# Patient Record
Sex: MALE | Race: BLACK OR AFRICAN AMERICAN | Employment: UNEMPLOYED | URBAN - METROPOLITAN AREA
[De-identification: names, ages, dates, MRNs, and addresses within clinical notes are randomized per-mention and may not be internally consistent; named-entity substitution may affect disease eponyms.]

---

## 2019-06-27 ENCOUNTER — APPOINTMENT (OUTPATIENT)
Dept: GENERAL RADIOLOGY | Age: 44
DRG: 291 | End: 2019-06-27
Attending: STUDENT IN AN ORGANIZED HEALTH CARE EDUCATION/TRAINING PROGRAM
Payer: MEDICARE

## 2019-06-27 ENCOUNTER — APPOINTMENT (OUTPATIENT)
Dept: NON INVASIVE DIAGNOSTICS | Age: 44
DRG: 291 | End: 2019-06-27
Attending: PHYSICIAN ASSISTANT
Payer: MEDICARE

## 2019-06-27 ENCOUNTER — APPOINTMENT (OUTPATIENT)
Dept: CT IMAGING | Age: 44
DRG: 291 | End: 2019-06-27
Attending: EMERGENCY MEDICINE
Payer: MEDICARE

## 2019-06-27 ENCOUNTER — HOSPITAL ENCOUNTER (EMERGENCY)
Age: 44
Discharge: LEFT BEFORE DISCHARGE | DRG: 291 | End: 2019-06-27
Attending: STUDENT IN AN ORGANIZED HEALTH CARE EDUCATION/TRAINING PROGRAM
Payer: MEDICARE

## 2019-06-27 VITALS
RESPIRATION RATE: 28 BRPM | HEART RATE: 106 BPM | SYSTOLIC BLOOD PRESSURE: 105 MMHG | HEIGHT: 78 IN | TEMPERATURE: 97.8 F | DIASTOLIC BLOOD PRESSURE: 76 MMHG | OXYGEN SATURATION: 96 %

## 2019-06-27 DIAGNOSIS — R07.9 ACUTE CHEST PAIN: Primary | ICD-10-CM

## 2019-06-27 DIAGNOSIS — K43.9 VENTRAL HERNIA WITHOUT OBSTRUCTION OR GANGRENE: ICD-10-CM

## 2019-06-27 DIAGNOSIS — R77.8 ELEVATED TROPONIN: ICD-10-CM

## 2019-06-27 DIAGNOSIS — I50.9 ACUTE ON CHRONIC CONGESTIVE HEART FAILURE, UNSPECIFIED HEART FAILURE TYPE (HCC): ICD-10-CM

## 2019-06-27 DIAGNOSIS — N17.9 ACUTE RENAL FAILURE, UNSPECIFIED ACUTE RENAL FAILURE TYPE (HCC): ICD-10-CM

## 2019-06-27 LAB
ALBUMIN SERPL-MCNC: 3 G/DL (ref 3.5–5)
ALBUMIN/GLOB SERPL: 0.9 {RATIO} (ref 1.1–2.2)
ALP SERPL-CCNC: 300 U/L (ref 45–117)
ALT SERPL-CCNC: 123 U/L (ref 12–78)
ANION GAP SERPL CALC-SCNC: 10 MMOL/L (ref 5–15)
AST SERPL-CCNC: 69 U/L (ref 15–37)
ATRIAL RATE: 106 BPM
BASOPHILS # BLD: 0 K/UL (ref 0–0.1)
BASOPHILS NFR BLD: 0 % (ref 0–1)
BILIRUB SERPL-MCNC: 1.8 MG/DL (ref 0.2–1)
BNP SERPL-MCNC: 3228 PG/ML
BUN SERPL-MCNC: 47 MG/DL (ref 6–20)
BUN/CREAT SERPL: 16 (ref 12–20)
CALCIUM SERPL-MCNC: 8.5 MG/DL (ref 8.5–10.1)
CALCULATED P AXIS, ECG09: 62 DEGREES
CALCULATED R AXIS, ECG10: 118 DEGREES
CALCULATED T AXIS, ECG11: 23 DEGREES
CHLORIDE SERPL-SCNC: 102 MMOL/L (ref 97–108)
CK MB CFR SERPL CALC: 0.9 % (ref 0–2.5)
CK MB SERPL-MCNC: 6.1 NG/ML (ref 5–25)
CK SERPL-CCNC: 695 U/L (ref 39–308)
CO2 SERPL-SCNC: 26 MMOL/L (ref 21–32)
COMMENT, HOLDF: NORMAL
CREAT SERPL-MCNC: 2.85 MG/DL (ref 0.7–1.3)
DIAGNOSIS, 93000: NORMAL
DIFFERENTIAL METHOD BLD: ABNORMAL
EOSINOPHIL # BLD: 0.1 K/UL (ref 0–0.4)
EOSINOPHIL NFR BLD: 1 % (ref 0–7)
ERYTHROCYTE [DISTWIDTH] IN BLOOD BY AUTOMATED COUNT: 13.6 % (ref 11.5–14.5)
GLOBULIN SER CALC-MCNC: 3.3 G/DL (ref 2–4)
GLUCOSE SERPL-MCNC: 158 MG/DL (ref 65–100)
HCT VFR BLD AUTO: 39.8 % (ref 36.6–50.3)
HGB BLD-MCNC: 12 G/DL (ref 12.1–17)
IMM GRANULOCYTES # BLD AUTO: 0 K/UL (ref 0–0.04)
IMM GRANULOCYTES NFR BLD AUTO: 1 % (ref 0–0.5)
LIPASE SERPL-CCNC: 510 U/L (ref 73–393)
LYMPHOCYTES # BLD: 1.6 K/UL (ref 0.8–3.5)
LYMPHOCYTES NFR BLD: 29 % (ref 12–49)
MCH RBC QN AUTO: 29.3 PG (ref 26–34)
MCHC RBC AUTO-ENTMCNC: 30.2 G/DL (ref 30–36.5)
MCV RBC AUTO: 97.3 FL (ref 80–99)
MONOCYTES # BLD: 0.9 K/UL (ref 0–1)
MONOCYTES NFR BLD: 17 % (ref 5–13)
NEUTS SEG # BLD: 3 K/UL (ref 1.8–8)
NEUTS SEG NFR BLD: 52 % (ref 32–75)
NRBC # BLD: 0.02 K/UL (ref 0–0.01)
NRBC BLD-RTO: 0.4 PER 100 WBC
P-R INTERVAL, ECG05: 150 MS
PLATELET # BLD AUTO: 204 K/UL (ref 150–400)
PMV BLD AUTO: 11.1 FL (ref 8.9–12.9)
POTASSIUM SERPL-SCNC: 4.2 MMOL/L (ref 3.5–5.1)
PROT SERPL-MCNC: 6.3 G/DL (ref 6.4–8.2)
Q-T INTERVAL, ECG07: 370 MS
QRS DURATION, ECG06: 96 MS
QTC CALCULATION (BEZET), ECG08: 491 MS
RBC # BLD AUTO: 4.09 M/UL (ref 4.1–5.7)
SAMPLES BEING HELD,HOLD: NORMAL
SODIUM SERPL-SCNC: 138 MMOL/L (ref 136–145)
TROPONIN I SERPL-MCNC: 0.18 NG/ML
VENTRICULAR RATE, ECG03: 106 BPM
WBC # BLD AUTO: 5.6 K/UL (ref 4.1–11.1)

## 2019-06-27 PROCEDURE — 71046 X-RAY EXAM CHEST 2 VIEWS: CPT

## 2019-06-27 PROCEDURE — 99285 EMERGENCY DEPT VISIT HI MDM: CPT

## 2019-06-27 PROCEDURE — 85025 COMPLETE CBC W/AUTO DIFF WBC: CPT

## 2019-06-27 PROCEDURE — 74011250637 HC RX REV CODE- 250/637: Performed by: EMERGENCY MEDICINE

## 2019-06-27 PROCEDURE — 96375 TX/PRO/DX INJ NEW DRUG ADDON: CPT

## 2019-06-27 PROCEDURE — 93306 TTE W/DOPPLER COMPLETE: CPT

## 2019-06-27 PROCEDURE — 83690 ASSAY OF LIPASE: CPT

## 2019-06-27 PROCEDURE — 74176 CT ABD & PELVIS W/O CONTRAST: CPT

## 2019-06-27 PROCEDURE — 84484 ASSAY OF TROPONIN QUANT: CPT

## 2019-06-27 PROCEDURE — 83880 ASSAY OF NATRIURETIC PEPTIDE: CPT

## 2019-06-27 PROCEDURE — 96374 THER/PROPH/DIAG INJ IV PUSH: CPT

## 2019-06-27 PROCEDURE — 93005 ELECTROCARDIOGRAM TRACING: CPT

## 2019-06-27 PROCEDURE — 74011250636 HC RX REV CODE- 250/636: Performed by: EMERGENCY MEDICINE

## 2019-06-27 PROCEDURE — 36415 COLL VENOUS BLD VENIPUNCTURE: CPT

## 2019-06-27 PROCEDURE — 80053 COMPREHEN METABOLIC PANEL: CPT

## 2019-06-27 PROCEDURE — 82550 ASSAY OF CK (CPK): CPT

## 2019-06-27 PROCEDURE — 82553 CREATINE MB FRACTION: CPT

## 2019-06-27 RX ORDER — FUROSEMIDE 10 MG/ML
40 INJECTION INTRAMUSCULAR; INTRAVENOUS
Status: COMPLETED | OUTPATIENT
Start: 2019-06-27 | End: 2019-06-27

## 2019-06-27 RX ORDER — GUAIFENESIN 100 MG/5ML
324 LIQUID (ML) ORAL
Status: COMPLETED | OUTPATIENT
Start: 2019-06-27 | End: 2019-06-27

## 2019-06-27 RX ORDER — FAMOTIDINE 10 MG/ML
20 INJECTION INTRAVENOUS
Status: COMPLETED | OUTPATIENT
Start: 2019-06-27 | End: 2019-06-27

## 2019-06-27 RX ORDER — FUROSEMIDE 20 MG/1
20 TABLET ORAL 2 TIMES DAILY
Qty: 14 TAB | Refills: 0 | Status: SHIPPED | OUTPATIENT
Start: 2019-06-27 | End: 2019-07-04

## 2019-06-27 RX ORDER — DIPHENHYDRAMINE HYDROCHLORIDE 50 MG/ML
25 INJECTION, SOLUTION INTRAMUSCULAR; INTRAVENOUS
Status: COMPLETED | OUTPATIENT
Start: 2019-06-27 | End: 2019-06-27

## 2019-06-27 RX ORDER — SODIUM CHLORIDE 0.9 % (FLUSH) 0.9 %
10 SYRINGE (ML) INJECTION
Status: DISCONTINUED | OUTPATIENT
Start: 2019-06-27 | End: 2019-06-27

## 2019-06-27 RX ORDER — PROCHLORPERAZINE EDISYLATE 5 MG/ML
5 INJECTION INTRAMUSCULAR; INTRAVENOUS
Status: COMPLETED | OUTPATIENT
Start: 2019-06-27 | End: 2019-06-27

## 2019-06-27 RX ADMIN — ASPIRIN 81 MG 324 MG: 81 TABLET ORAL at 13:55

## 2019-06-27 RX ADMIN — PROCHLORPERAZINE EDISYLATE 5 MG: 5 INJECTION INTRAMUSCULAR; INTRAVENOUS at 13:05

## 2019-06-27 RX ADMIN — DIPHENHYDRAMINE HYDROCHLORIDE 25 MG: 50 INJECTION, SOLUTION INTRAMUSCULAR; INTRAVENOUS at 13:05

## 2019-06-27 RX ADMIN — FAMOTIDINE 20 MG: 10 INJECTION, SOLUTION INTRAVENOUS at 13:04

## 2019-06-27 RX ADMIN — FUROSEMIDE 40 MG: 10 INJECTION, SOLUTION INTRAMUSCULAR; INTRAVENOUS at 13:55

## 2019-06-27 RX ADMIN — NITROGLYCERIN 0.5 INCH: 20 OINTMENT TOPICAL at 13:55

## 2019-06-27 NOTE — ED TRIAGE NOTES
Pt c/o chest pain , sob and vomiting since this am, pt will not look up or make eye contact, pt also c/o abd pain , denies fever, pt also wants prescription refills for lasix and potassium, etc..the patient was told by [facility] he could come here and get them

## 2019-06-27 NOTE — DISCHARGE INSTRUCTIONS
Patient Education        Chest Pain: Care Instructions  Your Care Instructions    There are many things that can cause chest pain. Some are not serious and will get better on their own in a few days. But some kinds of chest pain need more testing and treatment. Your doctor may have recommended a follow-up visit in the next 8 to 12 hours. If you are not getting better, you may need more tests or treatment. Even though your doctor has released you, you still need to watch for any problems. The doctor carefully checked you, but sometimes problems can develop later. If you have new symptoms or if your symptoms do not get better, get medical care right away. If you have worse or different chest pain or pressure that lasts more than 5 minutes or you passed out (lost consciousness), call 911 or seek other emergency help right away. A medical visit is only one step in your treatment. Even if you feel better, you still need to do what your doctor recommends, such as going to all suggested follow-up appointments and taking medicines exactly as directed. This will help you recover and help prevent future problems. How can you care for yourself at home? · Rest until you feel better. · Take your medicine exactly as prescribed. Call your doctor if you think you are having a problem with your medicine. · Do not drive after taking a prescription pain medicine. When should you call for help? Call 911 if:    · You passed out (lost consciousness).     · You have severe difficulty breathing.     · You have symptoms of a heart attack. These may include:  ? Chest pain or pressure, or a strange feeling in your chest.  ? Sweating. ? Shortness of breath. ? Nausea or vomiting. ? Pain, pressure, or a strange feeling in your back, neck, jaw, or upper belly or in one or both shoulders or arms. ? Lightheadedness or sudden weakness. ? A fast or irregular heartbeat.   After you call 911, the  may tell you to chew 1 adult-strength or 2 to 4 low-dose aspirin. Wait for an ambulance. Do not try to drive yourself.    Call your doctor today if:    · You have any trouble breathing.     · Your chest pain gets worse.     · You are dizzy or lightheaded, or you feel like you may faint.     · You are not getting better as expected.     · You are having new or different chest pain. Where can you learn more? Go to http://lowell-sanya.info/. Enter A120 in the search box to learn more about \"Chest Pain: Care Instructions. \"  Current as of: September 23, 2018  Content Version: 11.9  © 8308-1073 [employer]. Care instructions adapted under license by [employer] (which disclaims liability or warranty for this information). If you have questions about a medical condition or this instruction, always ask your healthcare professional. [place] [address] any warranty or liability for your use of this information. Patient Education        Heart Failure: Care Instructions  Your Care Instructions    Heart failure occurs when your heart does not pump as much blood as the body needs. Failure does not mean that the heart has stopped pumping but rather that it is not pumping as well as it should. Over time, this causes fluid buildup in your lungs and other parts of your body. Fluid buildup can cause shortness of breath, fatigue, swollen ankles, and other problems. By taking medicines regularly, reducing sodium (salt) in your diet, checking your weight every day, and making lifestyle changes, you can feel better and live longer. Follow-up care is a key part of your treatment and safety. Be sure to make and go to all appointments, and call your doctor if you are having problems. It's also a good idea to know your test results and keep a list of the medicines you take. How can you care for yourself at home? Medicines    · Be safe with medicines. Take your medicines exactly as prescribed. Call your doctor if you think you are having a problem with your medicine.     · Do not take any vitamins, over-the-counter medicine, or herbal products without talking to your doctor first. Nasima Popper not take ibuprofen (Advil or Motrin) and naproxen (Aleve) without talking to your doctor first. They could make your heart failure worse.     · You may be taking some of the following medicine. ? Beta-blockers can slow heart rate, decrease blood pressure, and improve your condition. Taking a beta-blocker may lower your chance of needing to be hospitalized. ? Angiotensin-converting enzyme inhibitors (ACEIs) reduce the heart's workload, lower blood pressure, and reduce swelling. Taking an ACEI may lower your chance of needing to be hospitalized again. ? Angiotensin II receptor blockers (ARBs) work like ACEIs. Your doctor may prescribe them instead of ACEIs. ? Diuretics, also called water pills, reduce swelling. ? Potassium supplements replace this important mineral, which is sometimes lost with diuretics. ? Aspirin and other blood thinners prevent blood clots, which can cause a stroke or heart attack.    You will get more details on the specific medicines your doctor prescribes. Diet    · Your doctor may suggest that you limit sodium to 2,000 milligrams (mg) a day or less. That is less than 1 teaspoon of salt a day, including all the salt you eat in cooking or in packaged foods. People get most of their sodium from processed foods. Fast food and restaurant meals also tend to be very high in sodium.     · Ask your doctor how much liquid you can drink each day. You may have to limit liquids.    Weight    · Weigh yourself without clothing at the same time each day. Record your weight. Call your doctor if you have a sudden weight gain, such as more than 2 to 3 pounds in a day or 5 pounds in a week.  (Your doctor may suggest a different range of weight gain.) A sudden weight gain may mean that your heart failure is getting worse.    Activity level    · Start light exercise (if your doctor says it is okay). Even if you can only do a small amount, exercise will help you get stronger, have more energy, and manage your weight and your stress. Walking is an easy way to get exercise. Start out by walking a little more than you did before. Bit by bit, increase the amount you walk.     · When you exercise, watch for signs that your heart is working too hard. You are pushing yourself too hard if you cannot talk while you are exercising. If you become short of breath or dizzy or have chest pain, stop, sit down, and rest.     · If you feel \"wiped out\" the day after you exercise, walk slower or for a shorter distance until you can work up to a better pace.     · Get enough rest at night. Sleeping with 1 or 2 pillows under your upper body and head may help you breathe easier.    Lifestyle changes    · Do not smoke. Smoking can make a heart condition worse. If you need help quitting, talk to your doctor about stop-smoking programs and medicines. These can increase your chances of quitting for good. Quitting smoking may be the most important step you can take to protect your heart.     · Limit alcohol to 2 drinks a day for men and 1 drink a day for women. Too much alcohol can cause health problems.     · Avoid getting sick from colds and the flu. Get a pneumococcal vaccine shot. If you have had one before, ask your doctor whether you need another dose. Get a flu shot each year. If you must be around people with colds or the flu, wash your hands often. When should you call for help? Call 911 if you have symptoms of sudden heart failure such as:    · You have severe trouble breathing.     · You cough up pink, foamy mucus.     · You have a new irregular or rapid heartbeat.    Call your doctor now or seek immediate medical care if:    · You have new or increased shortness of breath.     · You are dizzy or lightheaded, or you feel like you may faint.   · You have sudden weight gain, such as more than 2 to 3 pounds in a day or 5 pounds in a week. (Your doctor may suggest a different range of weight gain.)     · You have increased swelling in your legs, ankles, or feet.     · You are suddenly so tired or weak that you cannot do your usual activities.    Watch closely for changes in your health, and be sure to contact your doctor if you develop new symptoms. Where can you learn more? Go to http://lowell-sanya.info/. Enter L978 in the search box to learn more about \"Heart Failure: Care Instructions. \"  Current as of: July 22, 2018  Content Version: 11.9  © 9119-1206 [employer]. Care instructions adapted under license by [employer] (which disclaims liability or warranty for this information). If you have questions about a medical condition or this instruction, always ask your healthcare professional. [place] [address] any warranty or liability for your use of this information. Patient Education        Hernia: Care Instructions  Your Care Instructions    A hernia develops when tissue bulges through a weak spot in the wall of your belly. The groin area and the navel are common areas for a hernia. A hernia can also develop near the area of a surgery you had before. Pressure from lifting, straining, or coughing can tear the weak area, causing the hernia to bulge and be painful. If you cannot push a hernia back into place, the tissue may become trapped outside the belly wall. If the hernia gets twisted and loses its blood supply, it will swell and die. This is called a strangulated hernia. It usually causes a lot of pain. It needs treatment right away. Some hernias need to be repaired to prevent a strangulated hernia. If your hernia causes symptoms or is large, you may need surgery. Follow-up care is a key part of your treatment and safety.  Be sure to make and go to all appointments, and call your doctor if you are having problems. It's also a good idea to know your test results and keep a list of the medicines you take. How can you care for yourself at home? · Take care when lifting heavy objects. · Stay at a healthy weight. · Do not smoke. Smoking can cause coughing, which can cause your hernia to bulge. If you need help quitting, talk to your doctor about stop-smoking programs and medicines. These can increase your chances of quitting for good. · Talk with your doctor before wearing a corset or truss for a hernia. These devices are not recommended for treating hernias and sometimes can do more harm than good. There may be certain situations when your doctor thinks a truss would work, but these are rare. When should you call for help? Call your doctor now or seek immediate medical care if:    · You have new or worse belly pain.     · You are vomiting.     · You cannot pass stools or gas.     · You cannot push the hernia back into place with gentle pressure when you are lying down.     · The area over the hernia turns red or becomes tender.    Watch closely for changes in your health, and be sure to contact your doctor if you have any problems. Where can you learn more? Go to http://lowell-sanya.info/. Enter C129 in the search box to learn more about \"Hernia: Care Instructions. \"  Current as of: March 27, 2018  Content Version: 11.9  © 9509-7175 [employer]. Care instructions adapted under license by [employer] (which disclaims liability or warranty for this information). If you have questions about a medical condition or this instruction, always ask your healthcare professional. [place] [address] any warranty or liability for your use of this information. Patient Education        Learning About Heart Failure  What is heart failure? Heart failure means that your heart muscle does not pump as much blood as your body needs. Failure does not mean that your heart has stopped. It means that your heart is not pumping as well as it should. Your body has an amazing ability to make up for heart failure. It may do such a good job that you don't know you have a disease. But at some point, your heart and body will no longer be able to keep up. Then fluid starts to build up in your lungs and other parts of your body. What can you expect when you have heart failure? Heart failure is a lifelong (chronic) disease. Treatment may be able to slow the disease and help you feel better. But heart failure tends to get worse over time. Despite this, there are many steps you can take to feel better and stay healthy longer. Early on, your symptoms may not be too bad. As heart failure gets worse, symptoms typically get worse, and you may need to limit your activities. Heart failure can also get worse suddenly. If this happens, you need emergency care. Then, after treatment, your symptoms may go back to being stable (which means they stay the same) for a long time. Heart failure can lead to other health problems, such as heart rhythm problems. Over time, your treatment options may change, especially as your symptoms get worse. As heart failure gets worse, palliative care can help improve the quality of your life. You can do advance care planning to decide what kind of care you want at the end of your life. What are the symptoms? Symptoms of heart failure start to happen when your heart can't pump enough blood to the rest of your body. In the early stages of heart failure, you may:  · Feel tired easily. · Be short of breath when you exert yourself. · Feel like your heart is pounding or racing (palpitations). · Feel weak or dizzy. As heart failure gets worse, fluid starts to build up in your lungs and other parts of your body.  This may cause you to:  · Feel short of breath even at rest.  · Have swelling (edema), especially in your legs, ankles, and feet.  · Gain weight. This may happen over just a day or two, or more slowly. · Cough or wheeze, especially when you lie down. How is heart failure treated? · You'll probably take several medicines. · You might attend cardiac rehabilitation (rehab) to get education and support that help you make lifestyle changes and stay as healthy as possible. · You may get a heart device. A pacemaker helps your heart pump blood. An ICD can stop abnormal heart rhythms. How can you care for yourself? There are many steps you can take to feel better and stay healthy longer. These steps are an important part of treatment. They can help you stay active and enjoy life. · Take your medicine the right way. Avoid medicines that can make your symptoms worse. · Check your weight and symptoms every day. Know what to do if your symptoms get worse. · Limit sodium. This helps keep fluid from building up. It may help you feel better. · Be active. Exercise regularly, but don't exercise too hard. · Be heart-healthy. Eat healthy foods, stay at a healthy weight, limit alcohol, and don't smoke. · Stay as healthy as possible. Avoid colds and flu, get help for depression and anxiety, and manage stress. Follow-up care is a key part of your treatment and safety. Be sure to make and go to all appointments, and call your doctor if you are having problems. It's also a good idea to know your test results and keep a list of the medicines you take. Where can you learn more? Go to http://lowlel-sanya.info/. Enter X728 in the search box to learn more about \"Learning About Heart Failure. \"  Current as of: July 22, 2018  Content Version: 11.9  © 8171-1535 [employer]. Care instructions adapted under license by [employer] (which disclaims liability or warranty for this information).  If you have questions about a medical condition or this instruction, always ask your healthcare professional. [employer] disclaims any warranty or liability for your use of this information.

## 2019-06-27 NOTE — ED NOTES
12:15 PM  I have evaluated the patient as the Provider in Triage. I have reviewed His vital signs and the triage nurse assessment. I have talked with the patient and any available family and advised that I am the provider in triage and have ordered the appropriate study to initiate their work up based on the clinical presentation during my assessment. I have advised that the patient will be accommodated in the Main ED as soon as possible. I have also requested to contact the triage nurse or myself immediately if the patient experiences any changes in their condition during this brief waiting period. Regan Nicholas NP    Pt reports abrupt onset of SOB, severe abdominal pain, chest pain, vomiting and increased bilateral lower leg edema; hx of CHF, DM. Regan Nicholas NP

## 2019-06-27 NOTE — ED NOTES
Pt discharged AMA. Questions answered. IV pulled by patient with tip intact. Pt left on motorized scooter.  Unable to obtain full set of vitals due to pt refusal.
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
## 2019-06-27 NOTE — ED NOTES
[id number] MD instructed this RN to draw Troponin now. When this RN went to do so, pt had pulled IV line and would not allow this RN to stick for lab draw. Refusing care at this time. MD aware. 80 MD at bedside to discuss AMA with patient. Pt signed form with this RN as witness.

## 2019-06-27 NOTE — CONSULTS
[facility] Cardiology Consult         [address] [facility] [address], [address]   [phone] fax [phone]    Name: Vinnie Lima  [id number]  6/27/2019 4:01 PM    Assessment/Plan:  1. CHF EF 25%- per pt report, echo today to look at function  -previously offered ICD, declined, details unknown  -hypotensive without GDMT, apparently only on lasix po  -abd protuberance, mild LE swelling  Favor trial of GDMT with low dose toprol and isdn/hydralazine if staying inpt, unable to discuss with pt at this time and given hypotension and renal failure titration would ideally be inpt. 2. CKD/MICHAEL- crt 2.8 unk baseline, repeat am  3. There is no height or weight on file to calculate BMI. 4. Hypotension- presumed due to CHF  5. Tachycardia- would attempt bblockade  6. Troponin-nonspecific elevation    Interview is not revealing and guidance is limited by pts unwillingness to stay and unwillingness to communicate with me. Admit Date: 6/27/2019     Admit Diagnosis: No admission diagnoses are documented for this encounter. Primary Care Physician:Stella De La Vega MD     Attending Provider: Uriel Sampson MD  Requesting Provider:Dr. Jose Antonio Rob FOR CONSULT: CHF    Subjective:     Vinnie Lima is a 40 y.o. male admitted for abd pain. Unclear history and pt can not talk to me at all. He is sleepy/groggy and denies pain. When asked if he can/will talk to me he says \"no. \" Otherwise does not respond to my questions. Review of Symptoms:  Review of systems not obtained due to patient factors. Previous treatment/evaluation includes unknown . Cardiac risk factors: sedentary life style, male gender, hypertension, stress.     Past Medical History:   Diagnosis Date    [facility]congest[facility]) ([facility] [address]     Diabetes ([facility] [address]      Past Surgical History:   Procedure Laterality Date    HX OTHER SURGICAL      shoulder, hand, elbow and stomach surgery      No current facility-administered medications for this encounter. Current Outpatient Medications   Medication Sig    furosemide (LASIX) 20 mg tablet Take 1 Tab by mouth two (2) times a day for 7 days. Allergies   Allergen Reactions    Demerol [Meperidine] Other (comments)      No family history on file. Social History     Socioeconomic History    Marital status: SINGLE     Spouse name: Not on file    Number of children: Not on file    Years of education: Not on file    Highest education level: Not on file          Objective:      Physical Exam  Vitals:    06/27/19 1330 06/27/19 1355 06/27/19 1400 06/27/19 1503   BP: 110/69 110/69 109/86 109/86   Pulse: (!) 112 (!) 108 (!) 105    Resp: (!) 33  25    Temp:       SpO2:  94% 95%    Height:    7' (2.134 m)       General:  Sleepy nad   Eyes:  Conjunctivae/corneas clear     Ears:  Normal external ear canals both ears. Nose:  No drainage or sinus tenderness. Mouth/Throat: Moist mucous membranes. Dentition poor   Neck: Symmetrical, trachea midline, no carotid bruit     Back:   No CVA tenderness   Lungs:   Clear to auscultation bilaterally. Heart:  Regular rate and rhythm, S1, S2 normal, no murmur, click, rub or gallop. Abdomen:   Full firm protuberant ? tender   Extremities: Extremities mild le edema without trauma   Vascular: 1+ and symmetric LE bilat   Skin: Skin color normal. No rashes or lesions   Lymph nodes: No Lymphadenopathy   Neurologic: CNII-XII intact.  Normal strength        Telemetry: normal sinus rhythm  ECG: nonspecific ST and T waves changes  Echocardiogram: Abnormal, and reviewed by myself: LVH/strain    Data Review:     Recent Labs     06/27/19  1242   CKMB 6.1*   TROIQ 0.18*     Recent Labs     06/27/19  1242      K 4.2      CO2 26   BUN 47*   CREA 2.85*   *   CA 8.5     Recent Labs     06/27/19  1242   WBC 5.6   HGB 12.0*   HCT 39.8        Recent Labs     06/27/19  1242   SGOT 69*   *     No results for input(s): CHOL, LDLC in the last 72 hours.    No lab exists for component: TGL, HDLC,  HBA1C  No results for input(s): CRP, TSH, TSHEXT in the last 72 hours. No lab exists for component: ESR  Thank you very much for this referral. I appreciate the opportunity to participate in this patient's care.       MD Maia Narvaez MD

## 2019-06-28 ENCOUNTER — HOSPITAL ENCOUNTER (INPATIENT)
Age: 44
LOS: 1 days | Discharge: LEFT BEFORE DISCHARGE | DRG: 291 | End: 2019-06-30
Attending: EMERGENCY MEDICINE | Admitting: INTERNAL MEDICINE
Payer: MEDICARE

## 2019-06-28 ENCOUNTER — APPOINTMENT (OUTPATIENT)
Dept: CT IMAGING | Age: 44
DRG: 291 | End: 2019-06-28
Attending: FAMILY MEDICINE
Payer: MEDICARE

## 2019-06-28 ENCOUNTER — APPOINTMENT (OUTPATIENT)
Dept: GENERAL RADIOLOGY | Age: 44
DRG: 291 | End: 2019-06-28
Attending: EMERGENCY MEDICINE
Payer: MEDICARE

## 2019-06-28 DIAGNOSIS — Z79.899 RECEIVING INOTROPIC MEDICATION: ICD-10-CM

## 2019-06-28 DIAGNOSIS — I50.43 ACUTE ON CHRONIC COMBINED SYSTOLIC AND DIASTOLIC ACC/AHA STAGE C CONGESTIVE HEART FAILURE (HCC): ICD-10-CM

## 2019-06-28 DIAGNOSIS — R10.84 ABDOMINAL PAIN, GENERALIZED: Primary | ICD-10-CM

## 2019-06-28 DIAGNOSIS — R07.9 CHEST PAIN, UNSPECIFIED TYPE: ICD-10-CM

## 2019-06-28 PROBLEM — I50.9 CHF (CONGESTIVE HEART FAILURE) (HCC): Status: ACTIVE | Noted: 2019-06-28

## 2019-06-28 PROBLEM — R10.9 ABDOMINAL PAIN: Status: ACTIVE | Noted: 2019-06-28

## 2019-06-28 LAB
ALBUMIN SERPL-MCNC: 3 G/DL (ref 3.5–5)
ALBUMIN/GLOB SERPL: 0.9 {RATIO} (ref 1.1–2.2)
ALP SERPL-CCNC: 314 U/L (ref 45–117)
ALT SERPL-CCNC: 126 U/L (ref 12–78)
AMMONIA PLAS-SCNC: 35 UMOL/L
AMORPH CRY URNS QL MICRO: ABNORMAL
AMPHET UR QL SCN: NEGATIVE
ANION GAP SERPL CALC-SCNC: 11 MMOL/L (ref 5–15)
APPEARANCE UR: ABNORMAL
ARTERIAL PATENCY WRIST A: YES
AST SERPL-CCNC: 114 U/L (ref 15–37)
ATRIAL RATE: 112 BPM
BACTERIA URNS QL MICRO: NEGATIVE /HPF
BARBITURATES UR QL SCN: NEGATIVE
BASE DEFICIT BLD-SCNC: 1 MMOL/L
BASOPHILS # BLD: 0 K/UL (ref 0–0.1)
BASOPHILS NFR BLD: 0 % (ref 0–1)
BDY SITE: ABNORMAL
BENZODIAZ UR QL: NEGATIVE
BILIRUB SERPL-MCNC: 1.3 MG/DL (ref 0.2–1)
BILIRUB UR QL: NEGATIVE
BNP SERPL-MCNC: 2752 PG/ML
BUN SERPL-MCNC: 46 MG/DL (ref 6–20)
BUN/CREAT SERPL: 16 (ref 12–20)
CALCIUM SERPL-MCNC: 8.6 MG/DL (ref 8.5–10.1)
CALCULATED P AXIS, ECG09: 51 DEGREES
CALCULATED R AXIS, ECG10: 91 DEGREES
CALCULATED T AXIS, ECG11: 63 DEGREES
CANNABINOIDS UR QL SCN: NEGATIVE
CHLORIDE SERPL-SCNC: 102 MMOL/L (ref 97–108)
CO2 SERPL-SCNC: 24 MMOL/L (ref 21–32)
COCAINE UR QL SCN: NEGATIVE
COLOR UR: ABNORMAL
COMMENT, HOLDF: NORMAL
COMMENT, HOLDF: NORMAL
CREAT SERPL-MCNC: 2.96 MG/DL (ref 0.7–1.3)
DIAGNOSIS, 93000: NORMAL
DIFFERENTIAL METHOD BLD: ABNORMAL
DRUG SCRN COMMENT,DRGCM: NORMAL
ECHO AO ROOT DIAM: 3.06 CM
ECHO AV PEAK GRADIENT: 2.1 MMHG
ECHO AV PEAK VELOCITY: 71.66 CM/S
ECHO LA MAJOR AXIS: 4.93 CM
ECHO LA TO AORTIC ROOT RATIO: 1.61
ECHO LV E' LATERAL VELOCITY: 7.94 CM/S
ECHO LV E' SEPTAL VELOCITY: 7.29 CM/S
ECHO LV INTERNAL DIMENSION DIASTOLIC: 7.43 CM (ref 4.2–5.9)
ECHO LV INTERNAL DIMENSION SYSTOLIC: 7 CM
ECHO LV IVSD: 0.65 CM (ref 0.6–1)
ECHO LV MASS 2D: 264.1 G (ref 88–224)
ECHO LV MASS INDEX 2D: 113.9 G/M2 (ref 49–115)
ECHO LV POSTERIOR WALL DIASTOLIC: 0.7 CM (ref 0.6–1)
ECHO MV A VELOCITY: 55.48 CM/S
ECHO MV AREA PHT: 9.4 CM2
ECHO MV E DECELERATION TIME (DT): 80.4 MS
ECHO MV E VELOCITY: 86.34 CM/S
ECHO MV E/A RATIO: 1.56
ECHO MV E/E' LATERAL: 10.87
ECHO MV E/E' RATIO (AVERAGED): 11.36
ECHO MV E/E' SEPTAL: 11.84
ECHO MV PRESSURE HALF TIME (PHT): 23.3 MS
ECHO PULMONARY ARTERY SYSTOLIC PRESSURE (PASP): 48 MMHG
ECHO PV MAX VELOCITY: 41.54 CM/S
ECHO PV PEAK GRADIENT: 0.7 MMHG
ECHO RV INTERNAL DIMENSION: 4.65 CM
ECHO RV TAPSE: 1.69 CM (ref 1.5–2)
ECHO TV REGURGITANT MAX VELOCITY: 307.89 CM/S
ECHO TV REGURGITANT PEAK GRADIENT: 37.9 MMHG
EOSINOPHIL # BLD: 0.1 K/UL (ref 0–0.4)
EOSINOPHIL NFR BLD: 2 % (ref 0–7)
EPITH CASTS URNS QL MICRO: ABNORMAL /LPF
ERYTHROCYTE [DISTWIDTH] IN BLOOD BY AUTOMATED COUNT: 13.7 % (ref 11.5–14.5)
EST. AVERAGE GLUCOSE BLD GHB EST-MCNC: 200 MG/DL
ETHANOL SERPL-MCNC: <10 MG/DL
FERRITIN SERPL-MCNC: 35 NG/ML (ref 26–388)
GAS FLOW.O2 O2 DELIVERY SYS: ABNORMAL L/MIN
GLOBULIN SER CALC-MCNC: 3.5 G/DL (ref 2–4)
GLUCOSE BLD STRIP.AUTO-MCNC: 196 MG/DL (ref 65–100)
GLUCOSE BLD STRIP.AUTO-MCNC: 226 MG/DL (ref 65–100)
GLUCOSE SERPL-MCNC: 174 MG/DL (ref 65–100)
GLUCOSE UR STRIP.AUTO-MCNC: NEGATIVE MG/DL
HAV IGM SER QL: NONREACTIVE
HBA1C MFR BLD: 8.6 % (ref 4.2–6.3)
HBV CORE IGM SER QL: NONREACTIVE
HBV SURFACE AG SER QL: <0.1 INDEX
HBV SURFACE AG SER QL: NEGATIVE
HCO3 BLD-SCNC: 24 MMOL/L (ref 22–26)
HCT VFR BLD AUTO: 39.4 % (ref 36.6–50.3)
HCV AB SERPL QL IA: NONREACTIVE
HCV COMMENT,HCGAC: NORMAL
HGB BLD-MCNC: 12 G/DL (ref 12.1–17)
HGB UR QL STRIP: ABNORMAL
HIV 1+2 AB+HIV1 P24 AG SERPL QL IA: NONREACTIVE
HIV12 RESULT COMMENT, HHIVC: NORMAL
HYALINE CASTS URNS QL MICRO: ABNORMAL /LPF (ref 0–5)
IMM GRANULOCYTES # BLD AUTO: 0 K/UL (ref 0–0.04)
IMM GRANULOCYTES NFR BLD AUTO: 0 % (ref 0–0.5)
IRON SATN MFR SERPL: 5 % (ref 20–50)
IRON SERPL-MCNC: 19 UG/DL (ref 35–150)
KETONES UR QL STRIP.AUTO: NEGATIVE MG/DL
LEUKOCYTE ESTERASE UR QL STRIP.AUTO: NEGATIVE
LYMPHOCYTES # BLD: 1.8 K/UL (ref 0.8–3.5)
LYMPHOCYTES NFR BLD: 32 % (ref 12–49)
MAGNESIUM SERPL-MCNC: 2.1 MG/DL (ref 1.6–2.4)
MCH RBC QN AUTO: 29.8 PG (ref 26–34)
MCHC RBC AUTO-ENTMCNC: 30.5 G/DL (ref 30–36.5)
MCV RBC AUTO: 97.8 FL (ref 80–99)
METHADONE UR QL: NEGATIVE
MONOCYTES # BLD: 1 K/UL (ref 0–1)
MONOCYTES NFR BLD: 17 % (ref 5–13)
NEUTS SEG # BLD: 2.8 K/UL (ref 1.8–8)
NEUTS SEG NFR BLD: 49 % (ref 32–75)
NITRITE UR QL STRIP.AUTO: NEGATIVE
NRBC # BLD: 0 K/UL (ref 0–0.01)
NRBC BLD-RTO: 0 PER 100 WBC
OPIATES UR QL: NEGATIVE
P-R INTERVAL, ECG05: 148 MS
PCO2 BLD: 41.6 MMHG (ref 35–45)
PCP UR QL: NEGATIVE
PH BLD: 7.37 [PH] (ref 7.35–7.45)
PH UR STRIP: 5 [PH] (ref 5–8)
PLATELET # BLD AUTO: 207 K/UL (ref 150–400)
PMV BLD AUTO: 11.4 FL (ref 8.9–12.9)
PO2 BLD: 77 MMHG (ref 80–100)
POTASSIUM SERPL-SCNC: 4.8 MMOL/L (ref 3.5–5.1)
PROT SERPL-MCNC: 6.5 G/DL (ref 6.4–8.2)
PROT UR STRIP-MCNC: 100 MG/DL
Q-T INTERVAL, ECG07: 344 MS
QRS DURATION, ECG06: 94 MS
QTC CALCULATION (BEZET), ECG08: 469 MS
RBC # BLD AUTO: 4.03 M/UL (ref 4.1–5.7)
RBC #/AREA URNS HPF: ABNORMAL /HPF (ref 0–5)
SAMPLES BEING HELD,HOLD: NORMAL
SAO2 % BLD: 95 % (ref 92–97)
SERVICE CMNT-IMP: ABNORMAL
SERVICE CMNT-IMP: ABNORMAL
SODIUM SERPL-SCNC: 137 MMOL/L (ref 136–145)
SP GR UR REFRACTOMETRY: 1.02 (ref 1–1.03)
SP1: NORMAL
SP2: NORMAL
SP3: NORMAL
SPECIMEN TYPE: ABNORMAL
T4 SERPL-MCNC: 10.4 UG/DL (ref 4.5–12.1)
TIBC SERPL-MCNC: 382 UG/DL (ref 250–450)
TOTAL RESP. RATE, ITRR: 23
TROPONIN I SERPL-MCNC: 0.15 NG/ML
TROPONIN I SERPL-MCNC: 0.18 NG/ML
TROPONIN I SERPL-MCNC: 0.18 NG/ML
TSH SERPL DL<=0.05 MIU/L-ACNC: 11.1 UIU/ML (ref 0.36–3.74)
UR CULT HOLD, URHOLD: NORMAL
UROBILINOGEN UR QL STRIP.AUTO: 0.2 EU/DL (ref 0.2–1)
VENTRICULAR RATE, ECG03: 112 BPM
WBC # BLD AUTO: 5.8 K/UL (ref 4.1–11.1)
WBC URNS QL MICRO: ABNORMAL /HPF (ref 0–4)

## 2019-06-28 PROCEDURE — 84484 ASSAY OF TROPONIN QUANT: CPT

## 2019-06-28 PROCEDURE — 82784 ASSAY IGA/IGD/IGG/IGM EACH: CPT

## 2019-06-28 PROCEDURE — 74011250637 HC RX REV CODE- 250/637: Performed by: NURSE PRACTITIONER

## 2019-06-28 PROCEDURE — 83880 ASSAY OF NATRIURETIC PEPTIDE: CPT

## 2019-06-28 PROCEDURE — 99218 HC RM OBSERVATION: CPT

## 2019-06-28 PROCEDURE — 81001 URINALYSIS AUTO W/SCOPE: CPT

## 2019-06-28 PROCEDURE — 85025 COMPLETE CBC W/AUTO DIFF WBC: CPT

## 2019-06-28 PROCEDURE — 80307 DRUG TEST PRSMV CHEM ANLYZR: CPT

## 2019-06-28 PROCEDURE — 84481 FREE ASSAY (FT-3): CPT

## 2019-06-28 PROCEDURE — 93005 ELECTROCARDIOGRAM TRACING: CPT

## 2019-06-28 PROCEDURE — 83735 ASSAY OF MAGNESIUM: CPT

## 2019-06-28 PROCEDURE — 83036 HEMOGLOBIN GLYCOSYLATED A1C: CPT

## 2019-06-28 PROCEDURE — 82140 ASSAY OF AMMONIA: CPT

## 2019-06-28 PROCEDURE — 99285 EMERGENCY DEPT VISIT HI MDM: CPT

## 2019-06-28 PROCEDURE — 71045 X-RAY EXAM CHEST 1 VIEW: CPT

## 2019-06-28 PROCEDURE — 74011636637 HC RX REV CODE- 636/637: Performed by: FAMILY MEDICINE

## 2019-06-28 PROCEDURE — 83540 ASSAY OF IRON: CPT

## 2019-06-28 PROCEDURE — 84436 ASSAY OF TOTAL THYROXINE: CPT

## 2019-06-28 PROCEDURE — 82803 BLOOD GASES ANY COMBINATION: CPT

## 2019-06-28 PROCEDURE — 84443 ASSAY THYROID STIM HORMONE: CPT

## 2019-06-28 PROCEDURE — 96365 THER/PROPH/DIAG IV INF INIT: CPT

## 2019-06-28 PROCEDURE — 82728 ASSAY OF FERRITIN: CPT

## 2019-06-28 PROCEDURE — 80053 COMPREHEN METABOLIC PANEL: CPT

## 2019-06-28 PROCEDURE — 77010033678 HC OXYGEN DAILY

## 2019-06-28 PROCEDURE — 74011000250 HC RX REV CODE- 250: Performed by: NURSE PRACTITIONER

## 2019-06-28 PROCEDURE — 74011250636 HC RX REV CODE- 250/636: Performed by: FAMILY MEDICINE

## 2019-06-28 PROCEDURE — 36600 WITHDRAWAL OF ARTERIAL BLOOD: CPT

## 2019-06-28 PROCEDURE — 74011000250 HC RX REV CODE- 250: Performed by: INTERNAL MEDICINE

## 2019-06-28 PROCEDURE — 74011250637 HC RX REV CODE- 250/637: Performed by: FAMILY MEDICINE

## 2019-06-28 PROCEDURE — 80074 ACUTE HEPATITIS PANEL: CPT

## 2019-06-28 PROCEDURE — 94762 N-INVAS EAR/PLS OXIMTRY CONT: CPT

## 2019-06-28 PROCEDURE — 96366 THER/PROPH/DIAG IV INF ADDON: CPT

## 2019-06-28 PROCEDURE — 96375 TX/PRO/DX INJ NEW DRUG ADDON: CPT

## 2019-06-28 PROCEDURE — 36415 COLL VENOUS BLD VENIPUNCTURE: CPT

## 2019-06-28 PROCEDURE — 82962 GLUCOSE BLOOD TEST: CPT

## 2019-06-28 PROCEDURE — 87389 HIV-1 AG W/HIV-1&-2 AB AG IA: CPT

## 2019-06-28 RX ORDER — DEXTROSE 50 % IN WATER (D50W) INTRAVENOUS SYRINGE
25-50 AS NEEDED
Status: DISCONTINUED | OUTPATIENT
Start: 2019-06-28 | End: 2019-06-30 | Stop reason: HOSPADM

## 2019-06-28 RX ORDER — FUROSEMIDE 10 MG/ML
40 INJECTION INTRAMUSCULAR; INTRAVENOUS 2 TIMES DAILY
Status: DISCONTINUED | OUTPATIENT
Start: 2019-06-28 | End: 2019-06-28

## 2019-06-28 RX ORDER — SODIUM CHLORIDE 0.9 % (FLUSH) 0.9 %
5-40 SYRINGE (ML) INJECTION EVERY 8 HOURS
Status: DISCONTINUED | OUTPATIENT
Start: 2019-06-28 | End: 2019-06-30 | Stop reason: HOSPADM

## 2019-06-28 RX ORDER — MAGNESIUM SULFATE 100 %
4 CRYSTALS MISCELLANEOUS AS NEEDED
Status: DISCONTINUED | OUTPATIENT
Start: 2019-06-28 | End: 2019-06-30 | Stop reason: HOSPADM

## 2019-06-28 RX ORDER — HEPARIN SODIUM 5000 [USP'U]/ML
5000 INJECTION, SOLUTION INTRAVENOUS; SUBCUTANEOUS EVERY 8 HOURS
Status: DISCONTINUED | OUTPATIENT
Start: 2019-06-28 | End: 2019-06-30 | Stop reason: HOSPADM

## 2019-06-28 RX ORDER — GUAIFENESIN 100 MG/5ML
81 LIQUID (ML) ORAL DAILY
Status: DISCONTINUED | OUTPATIENT
Start: 2019-06-28 | End: 2019-06-28 | Stop reason: SDUPTHER

## 2019-06-28 RX ORDER — SODIUM CHLORIDE 0.9 % (FLUSH) 0.9 %
10 SYRINGE (ML) INJECTION
Status: ACTIVE | OUTPATIENT
Start: 2019-06-28 | End: 2019-06-29

## 2019-06-28 RX ORDER — OMEPRAZOLE 20 MG/1
20 CAPSULE, DELAYED RELEASE ORAL 2 TIMES DAILY
COMMUNITY

## 2019-06-28 RX ORDER — INSULIN LISPRO 100 [IU]/ML
INJECTION, SOLUTION INTRAVENOUS; SUBCUTANEOUS
Status: DISCONTINUED | OUTPATIENT
Start: 2019-06-28 | End: 2019-06-30 | Stop reason: HOSPADM

## 2019-06-28 RX ORDER — THERA TABS 400 MCG
1 TAB ORAL DAILY
COMMUNITY

## 2019-06-28 RX ORDER — PANTOPRAZOLE SODIUM 40 MG/1
40 TABLET, DELAYED RELEASE ORAL
Status: DISCONTINUED | OUTPATIENT
Start: 2019-06-28 | End: 2019-06-30 | Stop reason: HOSPADM

## 2019-06-28 RX ORDER — POTASSIUM CHLORIDE 750 MG/1
20 TABLET, FILM COATED, EXTENDED RELEASE ORAL DAILY
COMMUNITY

## 2019-06-28 RX ORDER — ASPIRIN 81 MG/1
81 TABLET ORAL DAILY
Status: DISCONTINUED | OUTPATIENT
Start: 2019-06-28 | End: 2019-06-30 | Stop reason: HOSPADM

## 2019-06-28 RX ORDER — SODIUM CHLORIDE 0.9 % (FLUSH) 0.9 %
5-40 SYRINGE (ML) INJECTION AS NEEDED
Status: DISCONTINUED | OUTPATIENT
Start: 2019-06-28 | End: 2019-06-30 | Stop reason: HOSPADM

## 2019-06-28 RX ORDER — DICYCLOMINE HYDROCHLORIDE 10 MG/ML
20 INJECTION INTRAMUSCULAR ONCE
Status: DISPENSED | OUTPATIENT
Start: 2019-06-28 | End: 2019-06-29

## 2019-06-28 RX ORDER — DOBUTAMINE HYDROCHLORIDE 200 MG/100ML
2.5 INJECTION INTRAVENOUS
Status: DISCONTINUED | OUTPATIENT
Start: 2019-06-28 | End: 2019-06-30 | Stop reason: HOSPADM

## 2019-06-28 RX ORDER — ACETAMINOPHEN 325 MG/1
650 TABLET ORAL
Status: DISCONTINUED | OUTPATIENT
Start: 2019-06-28 | End: 2019-06-30 | Stop reason: HOSPADM

## 2019-06-28 RX ADMIN — FUROSEMIDE 40 MG: 10 INJECTION, SOLUTION INTRAMUSCULAR; INTRAVENOUS at 10:12

## 2019-06-28 RX ADMIN — HEPARIN SODIUM 5000 UNITS: 5000 INJECTION INTRAVENOUS; SUBCUTANEOUS at 18:47

## 2019-06-28 RX ADMIN — DOBUTAMINE IN DEXTROSE 5 MCG/KG/MIN: 200 INJECTION, SOLUTION INTRAVENOUS at 16:29

## 2019-06-28 RX ADMIN — ASPIRIN 81 MG: 81 TABLET ORAL at 10:12

## 2019-06-28 RX ADMIN — BUMETANIDE 0.5 MG/HR: 0.25 INJECTION INTRAMUSCULAR; INTRAVENOUS at 18:38

## 2019-06-28 RX ADMIN — INSULIN LISPRO 2 UNITS: 100 INJECTION, SOLUTION INTRAVENOUS; SUBCUTANEOUS at 22:55

## 2019-06-28 RX ADMIN — HEPARIN SODIUM 5000 UNITS: 5000 INJECTION INTRAVENOUS; SUBCUTANEOUS at 10:12

## 2019-06-28 RX ADMIN — PANTOPRAZOLE SODIUM 40 MG: 40 TABLET, DELAYED RELEASE ORAL at 21:25

## 2019-06-28 RX ADMIN — INSULIN LISPRO 2 UNITS: 100 INJECTION, SOLUTION INTRAVENOUS; SUBCUTANEOUS at 18:52

## 2019-06-28 NOTE — CONSULTS
[facility]   [address] [facility], [address] [place] [address], [facility]  Phone: [phone] NOTE     Patient: Sukhdeep Ray MRN: [id number]  PCP: Cyrus Fernandez MD   :     1975  Age:   40 y.o. Sex:  male      Referring physician: Franklyn Hauser MD  Reason for consultation: 40 y.o. male with Abdominal pain [R49.0] complicated by MICHAEL   Admission Date: 2019  3:39 AM  LOS: 0 days      ASSESSMENT and PLAN :   ARF   · Suspect Cardiorenal syndrome  · Renal Imaging : Kidneys unremarkable   · UA : trace proteinuria and hematuria , otherwise unremarkable   · Volume overloaded from CHF  · Suggest Inotropes and diuresis at the direction of cardiology   · No emergent need for dialysis and he is not a candidate for dialysis due to severe CMP and poor compliance     Acute on Chronic Systolic CHF w/ EF 8-73%  · Per cardiology     Anxiety   Depression   Type II DM   PTSD    Care Plan discussed with:  Pt and cardiology       Thank you for consulting [facility] in the care of your patient. Subjective:   HPI: Sukhdeep Ray is a 40 y.o.  male who has been admitted to the hospital for worsening SOB, fatigue and edema. We were asked to see him for evaluation of ARF. He is a poor historian and unable to give me much Hx . He tells me that he is from [facility] [place] and is visiting family on [address] [place] [address]. His Hx from hospitalizations at [address] [place] [address] over the last 6 months is reviewed through care everywhere. He had normal renal function until April. His creatinine was 2.0-2.2 a week ago. He denies any NSAID use.  Denies any N/V/D/ denies any chest pain     Past Medical Hx:   Past Medical History:   Diagnosis Date    CHF (congestive heart failure) (HCC)     Diabetes ([facility] [address]         Past Surgical Hx:     Past Surgical History:   Procedure Laterality Date    HX OTHER SURGICAL      shoulder, hand, elbow and stomach surgery Allergies   Allergen Reactions    Demerol [Meperidine] Other (comments)       Social Hx:  reports that he has never smoked. He does not have any smokeless tobacco history on file. He reports that he does not drink alcohol or use drugs. No family history on file. Review of Systems:  A thorough twelve point review of system was performed today. Pertinent positives and negatives are mentioned in the HPI. The reminder of the ROS is negative and noncontributory. Objective:    Vitals:    Vitals:    06/28/19 1000 06/28/19 1048 06/28/19 1126 06/28/19 1130   BP: 105/77  124/81 118/77   Pulse: (!) 109  (!) 107 (!) 103   Resp: 16  18 18   Temp: 98 °F (36.7 °C)      SpO2: 94% 95% 99%    Weight:       Height:         I&O's:  No intake/output data recorded.   Visit Vitals  /77   Pulse (!) 103   Temp 98 °F (36.7 °C)   Resp 18   Ht 6' 1\" (1.854 m)   Wt 108.2 kg (238 lb 8 oz)   SpO2 99%   BMI 31.47 kg/m²       Physical Exam:  General:  Lethargic, ill looking   HEENT: PERRL,  No Pallor , No Icterus  Neck: Supple,no mass palpable  Lungs : CTA  CVS: RRR, S1 S2 normal, + murmur   Abdomen: Soft, NT, BS +  Extremities: 3+ Edema  Skin: scars- generalized   MS: No joint swelling, erythema, warmth  Neurologic: lethargic   Psych: Unable to assess    Laboratory Results:    Recent Labs     06/28/19  0929 06/28/19  0504 06/27/19  1242   NA  --  137 138   K  --  4.8 4.2   CL  --  102 102   CO2  --  24 26   GLU  --  174* 158*   BUN  --  46* 47*   CREA  --  2.96* 2.85*   CA  --  8.6 8.5   MG 2.1  --   --    ALB  --  3.0* 3.0*   SGOT  --  114* 69*   ALT  --  126* 123*     Recent Labs     06/28/19  0504 06/27/19  1242   WBC 5.8 5.6   HGB 12.0* 12.0*   HCT 39.4 39.8    204     No results found for: SDES  No results found for: CULT  Recent Results (from the past 24 hour(s))   CBC WITH AUTOMATED DIFF    Collection Time: 06/27/19 12:42 PM   Result Value Ref Range    WBC 5.6 4.1 - 11.1 K/uL    RBC 4.09 (L) 4.10 - 5.70 M/uL HGB 12.0 (L) 12.1 - 17.0 g/dL    HCT 39.8 36.6 - 50.3 %    MCV 97.3 80.0 - 99.0 FL    MCH 29.3 26.0 - 34.0 PG    MCHC 30.2 30.0 - 36.5 g/dL    RDW 13.6 11.5 - 14.5 %    PLATELET 920 263 - 581 K/uL    MPV 11.1 8.9 - 12.9 FL    NRBC 0.4 (H) 0  WBC    ABSOLUTE NRBC 0.02 (H) 0.00 - 0.01 K/uL    NEUTROPHILS 52 32 - 75 %    LYMPHOCYTES 29 12 - 49 %    MONOCYTES 17 (H) 5 - 13 %    EOSINOPHILS 1 0 - 7 %    BASOPHILS 0 0 - 1 %    IMMATURE GRANULOCYTES 1 (H) 0.0 - 0.5 %    ABS. NEUTROPHILS 3.0 1.8 - 8.0 K/UL    ABS. LYMPHOCYTES 1.6 0.8 - 3.5 K/UL    ABS. MONOCYTES 0.9 0.0 - 1.0 K/UL    ABS. EOSINOPHILS 0.1 0.0 - 0.4 K/UL    ABS. BASOPHILS 0.0 0.0 - 0.1 K/UL    ABS. IMM. GRANS. 0.0 0.00 - 0.04 K/UL    DF AUTOMATED     METABOLIC PANEL, COMPREHENSIVE    Collection Time: 06/27/19 12:42 PM   Result Value Ref Range    Sodium 138 136 - 145 mmol/L    Potassium 4.2 3.5 - 5.1 mmol/L    Chloride 102 97 - 108 mmol/L    CO2 26 21 - 32 mmol/L    Anion gap 10 5 - 15 mmol/L    Glucose 158 (H) 65 - 100 mg/dL    BUN 47 (H) 6 - 20 MG/DL    Creatinine 2.85 (H) 0.70 - 1.30 MG/DL    BUN/Creatinine ratio 16 12 - 20      GFR est AA 29 (L) >60 ml/min/1.73m2    GFR est non-AA 24 (L) >60 ml/min/1.73m2    Calcium 8.5 8.5 - 10.1 MG/DL    Bilirubin, total 1.8 (H) 0.2 - 1.0 MG/DL    ALT (SGPT) 123 (H) 12 - 78 U/L    AST (SGOT) 69 (H) 15 - 37 U/L    Alk. phosphatase 300 (H) 45 - 117 U/L    Protein, total 6.3 (L) 6.4 - 8.2 g/dL    Albumin 3.0 (L) 3.5 - 5.0 g/dL    Globulin 3.3 2.0 - 4.0 g/dL    A-G Ratio 0.9 (L) 1.1 - 2.2     SAMPLES BEING HELD    Collection Time: 06/27/19 12:42 PM   Result Value Ref Range    SAMPLES BEING HELD 1RED 1BLU     COMMENT        Add-on orders for these samples will be processed based on acceptable specimen integrity and analyte stability, which may vary by analyte.    TROPONIN I    Collection Time: 06/27/19 12:42 PM   Result Value Ref Range    Troponin-I, Qt. 0.18 (H) <0.05 ng/mL   LIPASE    Collection Time: 06/27/19 12:42 PM   Result Value Ref Range    Lipase 510 (H) 73 - 393 U/L   NT-PRO BNP    Collection Time: 06/27/19 12:42 PM   Result Value Ref Range    NT pro-BNP 3,228 (H) <125 PG/ML   CK W/ REFLX CKMB    Collection Time: 06/27/19 12:42 PM   Result Value Ref Range     (H) 39 - 308 U/L   CK-MB,QUANT.     Collection Time: 06/27/19 12:42 PM   Result Value Ref Range    CK - MB 6.1 (H) <3.6 NG/ML    CK-MB Index 0.9 0.0 - 2.5     ETHYL ALCOHOL    Collection Time: 06/27/19 12:42 PM   Result Value Ref Range    ALCOHOL(ETHYL),SERUM <10 <10 MG/DL   ECHO ADULT COMPLETE    Collection Time: 06/27/19  3:05 PM   Result Value Ref Range    LV E' Lateral Velocity 7.94 cm/s    LV E' Septal Velocity 7.29 cm/s    Tapse 1.69 1.5 - 2.0 cm    Ao Root D 3.06 cm    Aortic Valve Systolic Peak Velocity 19.59 cm/s    AoV PG 2.1 mmHg    LVIDd 7.43 (A) 4.2 - 5.9 cm    LVPWd 0.70 0.6 - 1.0 cm    LVIDs 7.00 cm    IVSd 0.65 0.6 - 1.0 cm    MVA (PHT) 9.4 cm2    MV A Trevor 55.48 cm/s    MV E Trevor 86.34 cm/s    MV E/A 1.56     Left Atrium to Aortic Root Ratio 1.61     RVIDd 4.65 cm    LV Mass .1 (A) 88 - 224 g    E/E' lateral 10.87     E/E' septal 11.84     E/E' ratio (averaged) 11.36     Mitral Valve E Wave Deceleration Time 80.4 ms    Mitral Valve Pressure Half-time 23.3 ms    Left Atrium Major Axis 4.93 cm    Triscuspid Valve Regurgitation Peak Gradient 37.9 mmHg    Pulmonic Valve Max Velocity 41.54 cm/s    TR Max Velocity 307.89 cm/s    PV peak gradient 0.7 mmHg    LV Mass AL Index 113.9 49 - 115 g/m2    PASP 48.0 mmHg   EKG, 12 LEAD, INITIAL    Collection Time: 06/28/19  4:47 AM   Result Value Ref Range    Ventricular Rate 112 BPM    Atrial Rate 112 BPM    P-R Interval 148 ms    QRS Duration 94 ms    Q-T Interval 344 ms    QTC Calculation (Bezet) 469 ms    Calculated P Axis 51 degrees    Calculated R Axis 91 degrees    Calculated T Axis 63 degrees    Diagnosis       Sinus tachycardia  Left atrial enlargement  Nonspecific ST abnormality  When compared with ECG of 27-JUN-2019 12:17,  Criteria for Anterior infarct are no longer present  Nonspecific T wave abnormality has replaced inverted T waves in Inferior   leads  Confirmed by Lolita Teixeira MD, [employer] Agent ([id number]) on 6/28/2019 9:32:35 AM     CBC WITH AUTOMATED DIFF    Collection Time: 06/28/19  5:04 AM   Result Value Ref Range    WBC 5.8 4.1 - 11.1 K/uL    RBC 4.03 (L) 4.10 - 5.70 M/uL    HGB 12.0 (L) 12.1 - 17.0 g/dL    HCT 39.4 36.6 - 50.3 %    MCV 97.8 80.0 - 99.0 FL    MCH 29.8 26.0 - 34.0 PG    MCHC 30.5 30.0 - 36.5 g/dL    RDW 13.7 11.5 - 14.5 %    PLATELET 973 184 - 790 K/uL    MPV 11.4 8.9 - 12.9 FL    NRBC 0.0 0  WBC    ABSOLUTE NRBC 0.00 0.00 - 0.01 K/uL    NEUTROPHILS 49 32 - 75 %    LYMPHOCYTES 32 12 - 49 %    MONOCYTES 17 (H) 5 - 13 %    EOSINOPHILS 2 0 - 7 %    BASOPHILS 0 0 - 1 %    IMMATURE GRANULOCYTES 0 0.0 - 0.5 %    ABS. NEUTROPHILS 2.8 1.8 - 8.0 K/UL    ABS. LYMPHOCYTES 1.8 0.8 - 3.5 K/UL    ABS. MONOCYTES 1.0 0.0 - 1.0 K/UL    ABS. EOSINOPHILS 0.1 0.0 - 0.4 K/UL    ABS. BASOPHILS 0.0 0.0 - 0.1 K/UL    ABS. IMM. GRANS. 0.0 0.00 - 0.04 K/UL    DF AUTOMATED     METABOLIC PANEL, COMPREHENSIVE    Collection Time: 06/28/19  5:04 AM   Result Value Ref Range    Sodium 137 136 - 145 mmol/L    Potassium 4.8 3.5 - 5.1 mmol/L    Chloride 102 97 - 108 mmol/L    CO2 24 21 - 32 mmol/L    Anion gap 11 5 - 15 mmol/L    Glucose 174 (H) 65 - 100 mg/dL    BUN 46 (H) 6 - 20 MG/DL    Creatinine 2.96 (H) 0.70 - 1.30 MG/DL    BUN/Creatinine ratio 16 12 - 20      GFR est AA 28 (L) >60 ml/min/1.73m2    GFR est non-AA 23 (L) >60 ml/min/1.73m2    Calcium 8.6 8.5 - 10.1 MG/DL    Bilirubin, total 1.3 (H) 0.2 - 1.0 MG/DL    ALT (SGPT) 126 (H) 12 - 78 U/L    AST (SGOT) 114 (H) 15 - 37 U/L    Alk.  phosphatase 314 (H) 45 - 117 U/L    Protein, total 6.5 6.4 - 8.2 g/dL    Albumin 3.0 (L) 3.5 - 5.0 g/dL    Globulin 3.5 2.0 - 4.0 g/dL    A-G Ratio 0.9 (L) 1.1 - 2.2     NT-PRO BNP    Collection Time: 06/28/19  5:04 AM Result Value Ref Range    NT pro-BNP 2,752 (H) <125 PG/ML   TROPONIN I    Collection Time: 06/28/19  5:04 AM   Result Value Ref Range    Troponin-I, Qt. 0.18 (H) <0.05 ng/mL   URINALYSIS W/MICROSCOPIC    Collection Time: 06/28/19  5:04 AM   Result Value Ref Range    Color YELLOW/STRAW      Appearance CLOUDY (A) CLEAR      Specific gravity 1.017 1.003 - 1.030      pH (UA) 5.0 5.0 - 8.0      Protein 100 (A) NEG mg/dL    Glucose NEGATIVE  NEG mg/dL    Ketone NEGATIVE  NEG mg/dL    Bilirubin NEGATIVE  NEG      Blood SMALL (A) NEG      Urobilinogen 0.2 0.2 - 1.0 EU/dL    Nitrites NEGATIVE  NEG      Leukocyte Esterase NEGATIVE  NEG      WBC 0-4 0 - 4 /hpf    RBC 0-5 0 - 5 /hpf    Epithelial cells FEW FEW /lpf    Bacteria NEGATIVE  NEG /hpf    Amorphous Crystals FEW (A) NEG      Hyaline cast 2-5 0 - 5 /lpf   URINE CULTURE HOLD SAMPLE    Collection Time: 06/28/19  5:04 AM   Result Value Ref Range    Urine culture hold        URINE ON HOLD IN MICROBIOLOGY DEPT FOR 3 DAYS. IF UNPRESERVED URINE IS SUBMITTED, IT CANNOT BE USED FOR ADDITIONAL TESTING AFTER 24 HRS, RECOLLECTION WILL BE REQUIRED. DRUG SCREEN, URINE    Collection Time: 06/28/19  5:04 AM   Result Value Ref Range    AMPHETAMINES NEGATIVE  NEG      BARBITURATES NEGATIVE  NEG      BENZODIAZEPINES NEGATIVE  NEG      COCAINE NEGATIVE  NEG      METHADONE NEGATIVE  NEG      OPIATES NEGATIVE  NEG      PCP(PHENCYCLIDINE) NEGATIVE  NEG      THC (TH-CANNABINOL) NEGATIVE  NEG      Drug screen comment (NOTE)    SAMPLES BEING HELD    Collection Time: 06/28/19  5:08 AM   Result Value Ref Range    COMMENT        Add-on orders for these samples will be processed based on acceptable specimen integrity and analyte stability, which may vary by analyte.    POC G3 - PUL    Collection Time: 06/28/19  8:37 AM   Result Value Ref Range    pH (POC) 7.370 7.35 - 7.45      pCO2 (POC) 41.6 35.0 - 45.0 MMHG    pO2 (POC) 77 (L) 80 - 100 MMHG    HCO3 (POC) 24.0 22 - 26 MMOL/L    sO2 (POC) 95 92 - 97 %    Base deficit (POC) 1 mmol/L    Site RIGHT RADIAL      Device: ROOM AIR      Allens test (POC) YES      Specimen type (POC) ARTERIAL      Total resp. rate 23     TROPONIN I    Collection Time: 06/28/19  9:29 AM   Result Value Ref Range    Troponin-I, Qt. 0.18 (H) <0.05 ng/mL   MAGNESIUM    Collection Time: 06/28/19  9:29 AM   Result Value Ref Range    Magnesium 2.1 1.6 - 2.4 mg/dL   TSH 3RD GENERATION    Collection Time: 06/28/19  9:29 AM   Result Value Ref Range    TSH 11.10 (H) 0.36 - 3.74 uIU/mL   SAMPLES BEING HELD    Collection Time: 06/28/19 10:58 AM   Result Value Ref Range    SAMPLES BEING HELD 1RED 1BLU 1PST 1LAV     COMMENT        Add-on orders for these samples will be processed based on acceptable specimen integrity and analyte stability, which may vary by analyte. Urine dipstick:   Lab Results   Component Value Date/Time    Color YELLOW/STRAW 06/28/2019 05:04 AM    Appearance CLOUDY (A) 06/28/2019 05:04 AM    Specific gravity 1.017 06/28/2019 05:04 AM    pH (UA) 5.0 06/28/2019 05:04 AM    Protein 100 (A) 06/28/2019 05:04 AM    Glucose NEGATIVE  06/28/2019 05:04 AM    Ketone NEGATIVE  06/28/2019 05:04 AM    Bilirubin NEGATIVE  06/28/2019 05:04 AM    Urobilinogen 0.2 06/28/2019 05:04 AM    Nitrites NEGATIVE  06/28/2019 05:04 AM    Leukocyte Esterase NEGATIVE  06/28/2019 05:04 AM    Epithelial cells FEW 06/28/2019 05:04 AM    Bacteria NEGATIVE  06/28/2019 05:04 AM    WBC 0-4 06/28/2019 05:04 AM    RBC 0-5 06/28/2019 05:04 AM       I have reviewed the following: All pertinent labs, microbiology data, radiology imaging for my assessment     Medications list Personally Reviewed   [x]      Yes     []               No       Medications:  Prior to Admission medications    Medication Sig Start Date End Date Taking? Authorizing Provider   potassium chloride SR (KLOR-CON 10) 10 mEq tablet Take 20 mEq by mouth daily.    Yes Provider, Historical   omeprazole (PRILOSEC) 20 mg capsule Take 20 mg by mouth two (2) times a day. Yes Provider, Historical   therapeutic multivitamin (THERAGRAN) tablet Take 1 Tab by mouth daily. Yes Provider, Historical   furosemide (LASIX) 20 mg tablet Take 1 Tab by mouth two (2) times a day for 7 days. 6/27/19 7/4/19  Armando Mccord MD        Thank you for allowing us to participate in the care of this patient. We will follow patient. Please dont hesitate to call with any questions    Dianne Cruz MD  [facility] Nephrology [facility]   [address] [facility] [place] [address] [place] [address]  Phone - [phone]   Fax - [phone]  [email or web address]

## 2019-06-28 NOTE — ED NOTES
Pt calling out. Checked on pt, who had taken leads off. When asked how he could be assisted, pt stated \"what the fuck am I doing here? \" pt appears to be in no distress at this time. MD notified.
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
## 2019-06-28 NOTE — ED NOTES
Pt pulled out PIV and took off cardiac leads. Refusing to let RN put leads back on or to restart IV. Explained to patient the necessity to have an IV for admission and to be monitored while in hospital. Pt stating, \"Just go please just go. \" Pt will not make eye contact and seldom answers questions directed to him. Hospitalist paged. [id number]: Cardiology PA, YENI Townsend, reviewed importance of maintaining IV access and monitoring patient while in hospital. Pt verbalized understanding and consents to PIV and medications.
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
## 2019-06-28 NOTE — PROGRESS NOTES
Admission Medication Reconciliation:    Information obtained from:  patient interview/RxQuery    Comments/Recommendations: Updated PTA meds/reviewed patient's allergies. Medication changes (since last review): Added  - Potassium Chloride  - Omperazole   - MVI    Adjusted  - none    Removed  - none       Allergies:  Demerol [meperidine]    Significant PMH/Disease States:   Past Medical History:   Diagnosis Date    CHF (congestive heart failure) ([facility]     Diabetes ([facility]        Chief Complaint for this Admission:    Chief Complaint   Patient presents with    Abdominal Pain    Vomiting       Prior to Admission Medications:   Prior to Admission Medications   Prescriptions Last Dose Informant Patient Reported? Taking?   furosemide (LASIX) 20 mg tablet   No No   Sig: Take 1 Tab by mouth two (2) times a day for 7 days. omeprazole (PRILOSEC) 20 mg capsule   Yes Yes   Sig: Take 20 mg by mouth two (2) times a day. potassium chloride SR (KLOR-CON 10) 10 mEq tablet   Yes Yes   Sig: Take 20 mEq by mouth daily. therapeutic multivitamin [facility]) tablet   Yes Yes   Sig: Take 1 Tab by mouth daily. Facility-Administered Medications: None     Thank you for allowing me to participate in the care of this patient. If there are any further questions, please contact the pharmacy at  or the medication reconciliation pharmacist at . Sherron Claros., [facility]

## 2019-06-28 NOTE — ED TRIAGE NOTES
Patient arrives to the ED with c/o distended abdo, vomiting and swelling to legs and feet, patient states he was seen in this ED earlier yesterday, patient states he is a heart failure patient.

## 2019-06-28 NOTE — CONSULTS
[facility] Consult Note      DOS:   6/28/2019  NAME:  Bouchra Adair   MRN:   [id number]   REFERRING PROVIDER:  Kanika Brown MD  PRIMARY CARE PHYSICIAN: Stella De La Vega MD  PRIMARY CARDIOLOGIST: Mary Kay Bob MD      Chief Complaint:   Chief Complaint   Patient presents with    Abdominal Pain    Vomiting       HPI: 40y.o. year old male with a history of CM, etiology unclear, DM Type II (uncontrolled), and medical non-compliance who presented to the ED on 6/28 with complaints of abdominal pain after signing out AMA the day prior. He reports related abdominal distention, nausea, vomiting, LE edema and dyspnea. He notes abrupt onset of symptoms. ED evaluation reveals elevated pro-BNP and MICHAEL on CKD, with Cr of 2.96.  CT performed was negative for acute abdominal process, but an incidental finding of right lung 1.6 cm mass is noted. Cardiac evaluation including TTE was performed which was remarkable for LVEF 15%, markedly dilated LV (> 7 cm), severe MR and mod-severe TR. He was started on dobutamine infusion and the [facility] has been consulted for assistance with the management of his HF. Impression / Plan:   Heart Failure Status: NYHA Class IV    Acute systolic heart failure, etiology unclear, EF 15%, NYHA Class IV  Agree with dobutamine 5 mcg/kg/min   Transition to bumex gtt 0.5 mg/hr   Intolerant of BB due to dobutamine  Intolerant of ACEi/ARB/ARNI/AA due to MICHAEL   Strict I/O, daily weights, Na+ restricted diet   Check gammopathy panel, free T3, iron profile/ferritin, HIV, hepatitis   Recommend cMRI +/- LHC if renal function recovers  RHC when euvolemic    MICHAEL   Due to CRS  Inotrope assisted diuresis  Appreciate Dr. Serrato Fitting assistance     High risk of SCD  Will need LifeVest prior to D/C   Keep K ? 4 and Mg > 2    Possible lung mass   Noted on CT  Pulmonary consult     Elevated TSH  Await results of free T3, free T4    Remainder of care per primary.       History:  Past Medical History:   Diagnosis Date    CHF (congestive heart failure) (HCC)     Diabetes (HCC)      Past Surgical History:   Procedure Laterality Date    HX OTHER SURGICAL      shoulder, hand, elbow and stomach surgery      Social History     Socioeconomic History    Marital status: SINGLE     Spouse name: Not on file    Number of children: Not on file    Years of education: Not on file    Highest education level: Not on file   Occupational History    Not on file   Social Needs    Financial resource strain: Not on file    Food insecurity:     Worry: Not on file     Inability: Not on file    Transportation needs:     Medical: Not on file     Non-medical: Not on file   Tobacco Use    Smoking status: Never Smoker   Substance and Sexual Activity    Alcohol use: Never     Frequency: Never    Drug use: Never    Sexual activity: Not on file   Lifestyle    Physical activity:     Days per week: Not on file     Minutes per session: Not on file    Stress: Not on file   Relationships    Social connections:     Talks on phone: Not on file     Gets together: Not on file     Attends Baptism service: Not on file     Active member of club or organization: Not on file     Attends meetings of clubs or organizations: Not on file     Relationship status: Not on file    Intimate partner violence:     Fear of current or ex partner: Not on file     Emotionally abused: Not on file     Physically abused: Not on file     Forced sexual activity: Not on file   Other Topics Concern    Not on file   Social History Narrative    Not on file     No family history on file.     Current Medications:   Current Facility-Administered Medications   Medication Dose Route Frequency Provider Last Rate Last Dose    sodium chloride (NS) flush 5-40 mL  5-40 mL IntraVENous Q8H Evangelina De Dios MD        sodium chloride (NS) flush 5-40 mL  5-40 mL IntraVENous PRN Maida Soto MD        acetaminophen (TYLENOL) tablet 650 mg  650 mg Oral Q4H PRN Chao Lee MD        heparin (porcine) injection 5,000 Units  5,000 Units SubCUTAneous Q8H Chao Lee MD   5,000 Units at 06/28/19 1012    aspirin delayed-release tablet 81 mg  81 mg Oral DAILY Chao Lee MD   81 mg at 06/28/19 1012    glucose chewable tablet 16 g  4 Tab Oral PRN Chao Lee MD        dextrose (D50W) injection syrg 12.5-25 g  25-50 mL IntraVENous PRN Chao Lee MD        glucagon (GLUCAGEN) injection 1 mg  1 mg IntraMUSCular PRN Chao Lee MD        insulin lispro (HUMALOG) injection   SubCUTAneous AC&HS Chao Lee MD        sodium chloride 0.9 % bolus infusion 100 mL  100 mL IntraVENous RAD ONCE Cal Ospina MD        iopamidol (ISOVUE-370) 76 % injection 100 mL  100 mL IntraVENous RAD ONCE Cal Ospina MD        sodium chloride (NS) flush 10 mL  10 mL IntraVENous RAD ONCE Cal Ospina MD        DOBUTamine (DOBUTREX) 500 mg/250 mL (2,000 mcg/mL) infusion  5 mcg/kg/min IntraVENous TITRATE Faiza Marcial MD 16.2 mL/hr at 06/28/19 1629 5 mcg/kg/min at 06/28/19 1629    bumetanide (BUMEX) 0.25 mg/mL infusion  0.5 mg/hr IntraVENous CONTINUOUS Melissa Jean NP         Current Outpatient Medications   Medication Sig Dispense Refill    potassium chloride SR (KLOR-CON 10) 10 mEq tablet Take 20 mEq by mouth daily.  omeprazole (PRILOSEC) 20 mg capsule Take 20 mg by mouth two (2) times a day.  therapeutic multivitamin (THERAGRAN) tablet Take 1 Tab by mouth daily.  furosemide (LASIX) 20 mg tablet Take 1 Tab by mouth two (2) times a day for 7 days. 14 Tab 0       Allergies: Allergies   Allergen Reactions    Demerol [Meperidine] Other (comments)       ROS:    Review of Systems   Constitutional: Negative. Negative for fever. HENT: Negative. Eyes: Negative. Respiratory: Positive for shortness of breath. Cardiovascular: Positive for chest pain, orthopnea and leg swelling.    Gastrointestinal: Positive for abdominal pain.   Genitourinary: Positive for frequency. Musculoskeletal: Negative. Skin: Negative. Neurological: Negative. Endo/Heme/Allergies: Negative. Psychiatric/Behavioral: Negative. Physical Exam:   Physical Exam   Constitutional: He appears well-developed and well-nourished. HENT:   Head: Normocephalic. Eyes: Pupils are equal, round, and reactive to light. Cardiovascular: Tachycardia present. Murmur heard. Systolic murmur is present. Pulmonary/Chest: He has no decreased breath sounds. Abdominal: He exhibits distension. There is hepatosplenomegaly. There is tenderness. Musculoskeletal: He exhibits edema. Psychiatric: His affect is blunt. Vitals:   Visit Vitals  /69 (BP 1 Location: Right arm, BP Patient Position: At rest)   Pulse (!) 109   Temp 98.2 °F (36.8 °C)   Resp 21   Ht 6' 1\" (1.854 m)   Wt 238 lb 8 oz (108.2 kg)   SpO2 96%   BMI 31.47 kg/m²         Temp (24hrs), Av.1 °F (36.7 °C), Min:98 °F (36.7 °C), Max:98.2 °F (36.8 °C)      Admission Weight: Last Weight   Weight: 238 lb 8 oz (108.2 kg) Weight: 238 lb 8 oz (108.2 kg)     Intake / Output / Drain:  Last 24 hrs.:     Intake/Output Summary (Last 24 hours) at 2019 1719  Last data filed at 2019 1705  Gross per 24 hour   Intake    Output 650 ml   Net -650 ml     Oxygen Therapy:  Oxygen Therapy  O2 Sat (%): 96 % (19 1500)  O2 Device: Room air (19 1500)  O2 Flow Rate (L/min): 3 l/min (19 1126)  ETCO2 (mmHg): 30 mmHg (19 0715)    Recent Labs:   Labs Latest Ref Rng & Units 2019   WBC 4.1 - 11.1 K/uL 5.8 5.6   RBC 4.10 - 5.70 M/uL 4.03(L) 4.09(L)   Hemoglobin 12.1 - 17.0 g/dL 12. 0(L) 12. 0(L)   Hematocrit 36.6 - 50.3 % 39.4 39.8   MCV 80.0 - 99.0 FL 97.8 97.3   Platelets 194 - 517 K/uL 207 204   Lymphocytes 12 - 49 % 32 29   Monocytes 5 - 13 % 17(H) 17(H)   Eosinophils 0 - 7 % 2 1   Basophils 0 - 1 % 0 0   Albumin 3.5 - 5.0 g/dL 3. 0(L) 3.0(L)   Calcium 8.5 - 10.1 MG/DL 8.6 8.5   SGOT 15 - 37 U/L 114(H) 69(H)   Glucose 65 - 100 mg/dL 174(H) 158(H)   BUN 6 - 20 MG/DL 46(H) 47(H)   Creatinine 0.70 - 1.30 MG/DL 2.96(H) 2.85(H)   Sodium 136 - 145 mmol/L 137 138   Potassium 3.5 - 5.1 mmol/L 4.8 4.2   TSH 0.36 - 3.74 uIU/mL 11.10(H) -     EKG:   EKG Results     Procedure 720 Value Units Date/Time    EKG 12 LEAD INITIAL [[id number]] Collected:  06/28/19 0447    Order Status:  Completed Updated:  06/28/19 0932     Ventricular Rate 112 BPM      Atrial Rate 112 BPM      P-R Interval 148 ms      QRS Duration 94 ms      Q-T Interval 344 ms      QTC Calculation (Bezet) 469 ms      Calculated P Axis 51 degrees      Calculated R Axis 91 degrees      Calculated T Axis 63 degrees      Diagnosis --     Sinus tachycardia  Left atrial enlargement  Nonspecific ST abnormality  When compared with ECG of 27-JUN-2019 12:17,  Criteria for Anterior infarct are no longer present  Nonspecific T wave abnormality has replaced inverted T waves in Inferior   leads  Confirmed by Toya Gutierrez MD, Margoth Arce ([id number]) on 6/28/2019 9:32:35 AM      EKG, 12 LEAD, INITIAL [[id number]]     Order Status:  Sent     EKG, 12 LEAD, INITIAL [[id number]]     Order Status:  Sent     EKG, 12 LEAD, INITIAL [[id number]]     Order Status:  Sent         Echocardiogram:  Interpretation Summary     · Left Ventricle: Normal wall thickness. Severely dilated left ventricle. Severe global systolic dysfunction. Estimated left ventricular ejection fraction is <15%. Visually measured ejection fraction. Left ventricular global hypokinesis. Inconclusive left ventricular diastolic function. · Mitral Valve: Severe mitral valve regurgitation. · Pulmonary Artery: Mild pulmonary hypertension. · Left Atrium: Dilated left atrium. · Tricuspid Valve: Moderate to severe tricuspid valve regurgitation is present. Echo Findings     Left Ventricle Normal wall thickness. Severely dilated left ventricle. Severe and global systolic dysfunction.  The estimated ejection fraction is <15%. Visually measured ejection fraction. Global hypokinesis observed. There is inconclusive left ventricular diastolic function. Wall Scoring The left ventricular wall motion is globally hypokinetic. Left Atrium Dilated left atrium. No atrial septal defect present. No patent foramen ovale visualized. Right Ventricle Normal cavity size, wall thickness and global systolic function. Right Atrium Normal cavity size. Aortic Valve Normal valve structure, trileaflet valve structure and no stenosis. Trace aortic valve regurgitation. Mitral Valve Normal valve structure and no stenosis. Severe regurgitation. The mitral regurgitant jet is posteriorly directed. Tricuspid Valve Normal valve structure and no stenosis. Moderate to severe tricuspid valve regurgitation. The tricuspid valve regurgitation jet is directed toward the septum. Pulmonary arterial systolic pressure is 84.6 mmHg. Mild pulmonary hypertension. Pulmonic Valve Pulmonic valve not well visualized. Aorta Normal aortic root, ascending aortic, and aortic arch. Pericardium Normal pericardium and no evidence of pericardial effusion. Wall Scoring     Score Index: 2.000 Percent Normal: 0.0%             The left ventricular wall motion is globally hypokinetic. TTE procedure Findings     TTE Procedure Information Image quality: adequate. The view(s) performed were parasternal, apical and subcostal. Color flow Doppler was performed and pulse wave and/or continuous wave Doppler was performed. No contrast was given.    Procedure Staff     Technologist/Clinician: Isaak Wylie  Supporting Staff: None  Performing Physician/Midlevel: None     Exam Completion Date/Time: 6/27/19  5:12 PM         2D/M-Mode Measurements     Dimensions   Measurement Value (Range)   LVIDs 7 cm      LVIDd 7.43 cm (4.2 - 5.9)      IVSd 0.65 cm (0.6 - 1.0)      LVPWd 0.7 cm (0.6 - 1.0)      LV Mass .1 g (88 - 224)      LV Mass AL Index 113.9 g/m2 (49 - 115)      Left Atrium Major Axis 4.93 cm      Left Atrium to Aortic Root Ratio 1.61       Tapse 1.69 cm (1.5 - 2.0)      RVIDd 4.65 cm                Aorta Measurements     Aorta   Measurement Value (Range)   Ao Root D 3.06 cm             Aortic Valve Measurements     Stenosis   Aortic Valve Systolic Peak Velocity 26.94 cm/s      AoV PG 2.1 mmHg               Mitral Valve Measurements     Stenosis   Mitral Valve Pressure Half-time 23.3 ms      MVA (PHT) 9.4 cm2               Tricuspid Valve Measurements     Stenosis   PASP 48 mmHg       Regurgitation   Triscuspid Valve Regurgitation Peak Gradient 37.9 mmHg      TR Max Velocity 307.89 cm/s              Pulmonary Measurements     Stenosis/Regurgitation   PV peak gradient 0.7 mmHg      Pulmonic Valve Max Velocity 41.54 cm/s              Diastolic Filling/Shunts     Diastolic Filling   LV E' Septal Velocity 7.29 cm/s      E/E' septal 11.84       LV E' Lateral Velocity 7.94 cm/s      E/E' lateral 10.87       E/E' ratio (averaged) 11.36       Mitral Valve E Wave Deceleration Time 80.4 ms      MV E Trevor 86.34 cm/s      MV A Trevor 55.48 cm/s      MV E/A 1.56               Radiology (CXR, CT scans):   Result Information     Status: Final result (Exam End: 6/28/2019 06:03) Provider Status: Open   Study Result     PORTABLE CHEST RADIOGRAPH/S: 6/28/2019 6:03 AM     Clinical history: Dyspnea  INDICATION:   short of breath  COMPARISON: 6/27/2019     FINDINGS:  AP portable upright view of the chest demonstrates a enlarged though stable  cardiopericardial silhouette. The lungs are diminished in volume with elevation  of the right hemidiaphragm. There is interstitial edema. . The osseous  structures are unremarkable.  Patient is on a cardiac monitor.      IMPRESSION  IMPRESSION:  There is a significant degree of cardiomegaly, mild interstitial pulmonary  edema.                 Donell Hong, NP    [address] [facility] [address] [address]  Office [phone]  Fax [phone]    24 hour VAD/HF Pager: [phone]

## 2019-06-28 NOTE — NURSE NAVIGATOR
Chart reviewed by Heart Failure Nurse Navigator. Heart Failure database completed. EF:  < 15%    ACEi/ARB/ARNi:     BB:     Aldosterone Antagonist:     Obstructive Sleep Apnea Screening:   STOP-BANG score:   Referred to Sleep Medicine:     CRT: offered previously but patient declined, per notes. NYHA Functional Class IV on admission. Heart Failure Teach Back in Patient Education. Heart Failure Avoiding Triggers on Discharge Instructions. Cardiologist: [facility] and [facility] have been consulted. Post discharge follow up phone call to be made within 48-72 hours of discharge.
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
## 2019-06-28 NOTE — ROUTINE PROCESS
TRANSFER - OUT REPORT: 
 
Verbal report given to Eric RN(name) on Sandrine Lugo  being transferred to 3N(unit) for routine progression of care Report consisted of patients Situation, Background, Assessment and  
Recommendations(SBAR). Information from the following report(s) SBAR, Kardex, ED Summary and MAR was reviewed with the receiving nurse. Lines:  
Peripheral IV 06/28/19 Right Wrist (Active) Site Assessment Clean, dry, & intact 6/28/2019 11:30 AM  
Phlebitis Assessment 0 6/28/2019 11:30 AM  
Infiltration Assessment 0 6/28/2019 11:30 AM  
Dressing Status Clean, dry, & intact 6/28/2019 11:30 AM  
  
 
Opportunity for questions and clarification was provided. Patient transported with: 
 Registered Nurse
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
## 2019-06-28 NOTE — ED NOTES
Pt falling asleep while RN talking to him. Pt only responding in \"mhmm\" and nods. OSats 89% on RA. Pt placed on 2L via NC. MD notified.

## 2019-06-28 NOTE — ED NOTES
Verbal shift change report given to [facility] (oncoming nurse) by Hill Ochoa RN (offgoing nurse). Report included the following information SBAR, ED Summary, MAR and Recent Results.

## 2019-06-29 PROBLEM — I50.9 HEART FAILURE (HCC): Status: ACTIVE | Noted: 2019-06-29

## 2019-06-29 LAB
ALBUMIN SERPL-MCNC: 3 G/DL (ref 3.5–5)
ALBUMIN/GLOB SERPL: 0.9 {RATIO} (ref 1.1–2.2)
ALP SERPL-CCNC: 291 U/L (ref 45–117)
ALT SERPL-CCNC: 128 U/L (ref 12–78)
ANION GAP SERPL CALC-SCNC: 8 MMOL/L (ref 5–15)
AST SERPL-CCNC: 104 U/L (ref 15–37)
BASOPHILS # BLD: 0 K/UL (ref 0–0.1)
BASOPHILS NFR BLD: 0 % (ref 0–1)
BILIRUB SERPL-MCNC: 1.9 MG/DL (ref 0.2–1)
BNP SERPL-MCNC: 1226 PG/ML
BUN SERPL-MCNC: 39 MG/DL (ref 6–20)
BUN/CREAT SERPL: 20 (ref 12–20)
CALCIUM SERPL-MCNC: 8.5 MG/DL (ref 8.5–10.1)
CHLORIDE SERPL-SCNC: 103 MMOL/L (ref 97–108)
CHOLEST SERPL-MCNC: 86 MG/DL
CO2 SERPL-SCNC: 34 MMOL/L (ref 21–32)
CREAT SERPL-MCNC: 1.98 MG/DL (ref 0.7–1.3)
DIFFERENTIAL METHOD BLD: ABNORMAL
EOSINOPHIL # BLD: 0.1 K/UL (ref 0–0.4)
EOSINOPHIL NFR BLD: 2 % (ref 0–7)
ERYTHROCYTE [DISTWIDTH] IN BLOOD BY AUTOMATED COUNT: 13.5 % (ref 11.5–14.5)
GLOBULIN SER CALC-MCNC: 3.3 G/DL (ref 2–4)
GLUCOSE BLD STRIP.AUTO-MCNC: 171 MG/DL (ref 65–100)
GLUCOSE BLD STRIP.AUTO-MCNC: 180 MG/DL (ref 65–100)
GLUCOSE BLD STRIP.AUTO-MCNC: 227 MG/DL (ref 65–100)
GLUCOSE BLD STRIP.AUTO-MCNC: 234 MG/DL (ref 65–100)
GLUCOSE SERPL-MCNC: 148 MG/DL (ref 65–100)
HCT VFR BLD AUTO: 37.9 % (ref 36.6–50.3)
HDLC SERPL-MCNC: 29 MG/DL
HDLC SERPL: 3 {RATIO} (ref 0–5)
HGB BLD-MCNC: 11.5 G/DL (ref 12.1–17)
IMM GRANULOCYTES # BLD AUTO: 0 K/UL (ref 0–0.04)
IMM GRANULOCYTES NFR BLD AUTO: 0 % (ref 0–0.5)
LACTATE SERPL-SCNC: 1.8 MMOL/L (ref 0.4–2)
LDLC SERPL CALC-MCNC: 46.8 MG/DL (ref 0–100)
LIPID PROFILE,FLP: NORMAL
LYMPHOCYTES # BLD: 1.1 K/UL (ref 0.8–3.5)
LYMPHOCYTES NFR BLD: 29 % (ref 12–49)
MAGNESIUM SERPL-MCNC: 1.6 MG/DL (ref 1.6–2.4)
MCH RBC QN AUTO: 29.1 PG (ref 26–34)
MCHC RBC AUTO-ENTMCNC: 30.3 G/DL (ref 30–36.5)
MCV RBC AUTO: 95.9 FL (ref 80–99)
MONOCYTES # BLD: 0.6 K/UL (ref 0–1)
MONOCYTES NFR BLD: 16 % (ref 5–13)
NEUTS SEG # BLD: 2.1 K/UL (ref 1.8–8)
NEUTS SEG NFR BLD: 53 % (ref 32–75)
NRBC # BLD: 0 K/UL (ref 0–0.01)
NRBC BLD-RTO: 0 PER 100 WBC
PLATELET # BLD AUTO: 192 K/UL (ref 150–400)
PMV BLD AUTO: 10.9 FL (ref 8.9–12.9)
POTASSIUM SERPL-SCNC: 3.3 MMOL/L (ref 3.5–5.1)
PROT SERPL-MCNC: 6.3 G/DL (ref 6.4–8.2)
RBC # BLD AUTO: 3.95 M/UL (ref 4.1–5.7)
SERVICE CMNT-IMP: ABNORMAL
SODIUM SERPL-SCNC: 145 MMOL/L (ref 136–145)
T3FREE SERPL-MCNC: 2.4 PG/ML (ref 2.2–4)
TRIGL SERPL-MCNC: 51 MG/DL (ref ?–150)
VLDLC SERPL CALC-MCNC: 10.2 MG/DL
WBC # BLD AUTO: 3.9 K/UL (ref 4.1–11.1)

## 2019-06-29 PROCEDURE — 99242 OFF/OP CONSLTJ NEW/EST SF 20: CPT | Performed by: INTERNAL MEDICINE

## 2019-06-29 PROCEDURE — 74011250636 HC RX REV CODE- 250/636: Performed by: FAMILY MEDICINE

## 2019-06-29 PROCEDURE — 83605 ASSAY OF LACTIC ACID: CPT

## 2019-06-29 PROCEDURE — 74011250637 HC RX REV CODE- 250/637: Performed by: INTERNAL MEDICINE

## 2019-06-29 PROCEDURE — 74011000250 HC RX REV CODE- 250: Performed by: NURSE PRACTITIONER

## 2019-06-29 PROCEDURE — 74011000250 HC RX REV CODE- 250: Performed by: INTERNAL MEDICINE

## 2019-06-29 PROCEDURE — 82962 GLUCOSE BLOOD TEST: CPT

## 2019-06-29 PROCEDURE — 74011250636 HC RX REV CODE- 250/636: Performed by: INTERNAL MEDICINE

## 2019-06-29 PROCEDURE — 74011636637 HC RX REV CODE- 636/637: Performed by: FAMILY MEDICINE

## 2019-06-29 PROCEDURE — 80061 LIPID PANEL: CPT

## 2019-06-29 PROCEDURE — 74011250637 HC RX REV CODE- 250/637: Performed by: FAMILY MEDICINE

## 2019-06-29 PROCEDURE — 36415 COLL VENOUS BLD VENIPUNCTURE: CPT

## 2019-06-29 PROCEDURE — 85025 COMPLETE CBC W/AUTO DIFF WBC: CPT

## 2019-06-29 PROCEDURE — 83735 ASSAY OF MAGNESIUM: CPT

## 2019-06-29 PROCEDURE — 74011250637 HC RX REV CODE- 250/637: Performed by: NURSE PRACTITIONER

## 2019-06-29 PROCEDURE — 65660000000 HC RM CCU STEPDOWN

## 2019-06-29 PROCEDURE — 83880 ASSAY OF NATRIURETIC PEPTIDE: CPT

## 2019-06-29 PROCEDURE — 80053 COMPREHEN METABOLIC PANEL: CPT

## 2019-06-29 PROCEDURE — 99218 HC RM OBSERVATION: CPT

## 2019-06-29 RX ORDER — POTASSIUM CHLORIDE 750 MG/1
40 TABLET, FILM COATED, EXTENDED RELEASE ORAL 2 TIMES DAILY
Status: DISCONTINUED | OUTPATIENT
Start: 2019-06-29 | End: 2019-06-30 | Stop reason: HOSPADM

## 2019-06-29 RX ORDER — GABAPENTIN 100 MG/1
100 CAPSULE ORAL 3 TIMES DAILY
COMMUNITY

## 2019-06-29 RX ORDER — GABAPENTIN 100 MG/1
100 CAPSULE ORAL ONCE
Status: COMPLETED | OUTPATIENT
Start: 2019-06-30 | End: 2019-06-29

## 2019-06-29 RX ORDER — POTASSIUM CHLORIDE 750 MG/1
40 TABLET, FILM COATED, EXTENDED RELEASE ORAL DAILY
Status: DISCONTINUED | OUTPATIENT
Start: 2019-06-29 | End: 2019-06-29

## 2019-06-29 RX ORDER — MAGNESIUM SULFATE HEPTAHYDRATE 40 MG/ML
2 INJECTION, SOLUTION INTRAVENOUS ONCE
Status: COMPLETED | OUTPATIENT
Start: 2019-06-29 | End: 2019-06-29

## 2019-06-29 RX ORDER — LEVOTHYROXINE SODIUM 25 UG/1
12.5 TABLET ORAL DAILY
Status: DISCONTINUED | OUTPATIENT
Start: 2019-06-29 | End: 2019-06-30 | Stop reason: HOSPADM

## 2019-06-29 RX ADMIN — POTASSIUM CHLORIDE 40 MEQ: 750 TABLET, FILM COATED, EXTENDED RELEASE ORAL at 17:41

## 2019-06-29 RX ADMIN — HEPARIN SODIUM 5000 UNITS: 5000 INJECTION INTRAVENOUS; SUBCUTANEOUS at 17:41

## 2019-06-29 RX ADMIN — INSULIN LISPRO 2 UNITS: 100 INJECTION, SOLUTION INTRAVENOUS; SUBCUTANEOUS at 23:08

## 2019-06-29 RX ADMIN — BUMETANIDE 0.5 MG/HR: 0.25 INJECTION INTRAMUSCULAR; INTRAVENOUS at 10:51

## 2019-06-29 RX ADMIN — Medication 10 ML: at 05:53

## 2019-06-29 RX ADMIN — GABAPENTIN 100 MG: 100 CAPSULE ORAL at 23:47

## 2019-06-29 RX ADMIN — LEVOTHYROXINE SODIUM 12.5 MCG: 25 TABLET ORAL at 12:18

## 2019-06-29 RX ADMIN — HEPARIN SODIUM 5000 UNITS: 5000 INJECTION INTRAVENOUS; SUBCUTANEOUS at 10:47

## 2019-06-29 RX ADMIN — PANTOPRAZOLE SODIUM 40 MG: 40 TABLET, DELAYED RELEASE ORAL at 07:25

## 2019-06-29 RX ADMIN — ASPIRIN 81 MG: 81 TABLET ORAL at 08:34

## 2019-06-29 RX ADMIN — MAGNESIUM SULFATE HEPTAHYDRATE 2 G: 40 INJECTION, SOLUTION INTRAVENOUS at 13:22

## 2019-06-29 RX ADMIN — INSULIN LISPRO 2 UNITS: 100 INJECTION, SOLUTION INTRAVENOUS; SUBCUTANEOUS at 07:55

## 2019-06-29 RX ADMIN — INSULIN LISPRO 3 UNITS: 100 INJECTION, SOLUTION INTRAVENOUS; SUBCUTANEOUS at 11:30

## 2019-06-29 RX ADMIN — DOBUTAMINE IN DEXTROSE 5 MCG/KG/MIN: 200 INJECTION, SOLUTION INTRAVENOUS at 10:51

## 2019-06-29 RX ADMIN — POTASSIUM CHLORIDE 40 MEQ: 750 TABLET, FILM COATED, EXTENDED RELEASE ORAL at 12:18

## 2019-06-29 RX ADMIN — HEPARIN SODIUM 5000 UNITS: 5000 INJECTION INTRAVENOUS; SUBCUTANEOUS at 02:00

## 2019-06-29 NOTE — PROGRESS NOTES
[address] [facility] in [address] [place] [address]  Inpatient Progress Note      Patient name: Bouchra Adair  Patient : 1975  Patient MRN: [id number]  Attending MD: Marshall Glavez MD  Date of service: 19    CHIEF COMPLAINT:  Acute on chronic systolic heart failure    HPI: 40y.o. year old male with a history of CM, etiology unclear, DM Type II (uncontrolled), and medical non-compliance who presented to the ED on  with complaints of abdominal pain after signing out AMA the day prior. He reports related abdominal distention, nausea, vomiting, LE edema and dyspnea. He notes abrupt onset of symptoms. ED evaluation reveals elevated pro-BNP and MICHAEL on CKD, with Cr of 2.96.  CT performed was negative for acute abdominal process, but an incidental finding of right lung 1.6 cm mass is noted. Cardiac evaluation including TTE was performed which was remarkable for LVEF 15%, markedly dilated LV (> 7 cm), severe MR and mod-severe TR.   He was started on dobutamine infusion and the [facility] has been consulted for assistance with the management of his HF.      Impression / Plan:   Heart Failure Status: NYHA Class IV     Acute systolic heart failure, etiology unclear, EF 15%, NYHA Class IV  Continue dobutamine 5 mcg/kg/min   Continue bumex gtt 0.5 mg/hr (net neg 5 liters, but weight has not changed)  Intolerant of BB due to dobutamine  Intolerant of ACEi/ARB/ARNI/AA due to MICHAEL   Strict I/O, daily weights, Na+ restricted diet   Check gammopathy panel, free T3, iron profile/ferritin, HIV, hepatitis   Recommend cMRI +/- LHC if renal function recovers  RHC when euvolemic  Venofer infusion  Start small dose synthroid 12.5 mcg daily  Increased potassium to 40meq BID    MICHAEL   Due to CRS  Inotrope assisted diuresis  Appreciate Dr. Serrato Fitting assistance      High risk of SCD  Will need LifeVest prior to D/C   Keep K ? 4 and Mg > 2     Possible lung mass   Noted on CT  Pulmonary consult    Elevated TSH  Await results of free T3, free T4       PHYSICAL EXAM:  Visit Vitals  BP 99/59 (BP 1 Location: Right arm)   Pulse (!) 107   Temp 97.9 °F (36.6 °C)   Resp 16   Ht 6' 1\" (1.854 m)   Wt 262 lb 9.1 oz (119.1 kg)   SpO2 98%   BMI 34.64 kg/m²     General: Patient is well developed, well-nourished in no acute distress  HEENT: Normocephalic and atraumatic. No scleral icterus. Pupils are equal, round and reactive to light and accomodation. No conjunctival injection. Oropharynx is clear. Neck: Supple. No evidence of thyroid enlargements or lymphadenopathy. JVD: Cannot be appreciated   Lungs: Breath sounds are equal and clear bilaterally. No wheezes, rhonchi, or rales. Heart: Regular rate and rhythm with normal S1 and S2. No murmurs, gallops or rubs. Abdomen: Soft, no mass or tenderness. No organomegaly or hernia. Bowel sounds present. Genitourinary and rectal: deferred  Extremities: No cyanosis, clubbing, 3+BLE edema abdominal swelling  Neurologic: No focal sensory or motor deficits are noted. Grossly intact. Psychiatric: Awake, alert an doriented x 3. Appropriate mood and affect. Skin: Warm, dry and well perfused. No lesions, nodules or rashes are noted. REVIEW OF SYSTEMS:  General: Denies fever, night sweats. Ear, nose and throat: Denies difficulty hearing, sinus problems, runny nose, post-nasal drip, ringing in ears, mouth sores, loose teeth, ear pain, nosebleeds, sore throate, facial pain or numbess  Cardiovascular: see above in the interval history  Respiratory: Denies cough, wheezing, sputum production, hemoptysis.   Gastrointestinal: Denies heartburn, constipation, intolerance to certain foods, diarrhea, abdominal pain, nausea, vomiting, difficulty swallowing, blood in stool  Kidney and bladder: Denies painful urination, frequent urination, urgency, prostate problems and impotence  Musculoskeletal: Denies joint pain, muscle weakness  Skin and hair: Denies change in existing skin lesions, hair loss or increase, breast changes    PAST MEDICAL HISTORY:  Past Medical History:   Diagnosis Date    CHF (congestive heart failure) ([facility])     Diabetes ([facility] [address]        PAST SURGICAL HISTORY:  Past Surgical History:   Procedure Laterality Date    HX OTHER SURGICAL      shoulder, hand, elbow and stomach surgery        FAMILY HISTORY:  No family history on file. SOCIAL HISTORY:  Social History     Socioeconomic History    Marital status: SINGLE     Spouse name: Not on file    Number of children: Not on file    Years of education: Not on file    Highest education level: Not on file   Tobacco Use    Smoking status: Never Smoker   Substance and Sexual Activity    Alcohol use: Never     Frequency: Never    Drug use: Never       LABORATORY RESULTS:     Labs Latest Ref Rng & Units 6/29/2019 6/28/2019 6/27/2019   WBC 4.1 - 11.1 K/uL 3. 9(L) 5.8 5.6   RBC 4.10 - 5.70 M/uL 3.95(L) 4.03(L) 4.09(L)   Hemoglobin 12.1 - 17.0 g/dL 11. 5(L) 12. 0(L) 12. 0(L)   Hematocrit 36.6 - 50.3 % 37.9 39.4 39.8   MCV 80.0 - 99.0 FL 95.9 97.8 97.3   Platelets 503 - 136 K/uL 192 207 204   Lymphocytes 12 - 49 % 29 32 29   Monocytes 5 - 13 % 16(H) 17(H) 17(H)   Eosinophils 0 - 7 % 2 2 1   Basophils 0 - 1 % 0 0 0   Albumin 3.5 - 5.0 g/dL 3. 0(L) 3.0(L) 3.0(L)   Calcium 8.5 - 10.1 MG/DL 8.5 8.6 8.5   SGOT 15 - 37 U/L 104(H) 114(H) 69(H)   Glucose 65 - 100 mg/dL 148(H) 174(H) 158(H)   BUN 6 - 20 MG/DL 39(H) 46(H) 47(H)   Creatinine 0.70 - 1.30 MG/DL 1.98(H) 2.96(H) 2.85(H)   Sodium 136 - 145 mmol/L 145 137 138   Potassium 3.5 - 5.1 mmol/L 3. 3(L) 4.8 4.2   TSH 0.36 - 3.74 uIU/mL - 11.10(H) -     Lab Results   Component Value Date/Time    TSH 11.10 (H) 06/28/2019 09:29 AM       CURRENT MEDICATIONS:    Current Facility-Administered Medications:     potassium chloride SR (KLOR-CON 10) tablet 40 mEq, 40 mEq, Oral, DAILY, Ramirez Howard MD    sodium chloride (NS) flush 5-40 mL, 5-40 mL, IntraVENous, Q8H, Evangelina De Dios MD, 10 mL at 06/29/19 200    sodium chloride (NS) flush 5-40 mL, 5-40 mL, IntraVENous, PRN, Emilia Boyd MD    acetaminophen (TYLENOL) tablet 650 mg, 650 mg, Oral, Q4H PRN, Emilia Boyd MD    heparin (porcine) injection 5,000 Units, 5,000 Units, SubCUTAneous, Q8H, Emilia Boyd MD, 5,000 Units at 06/29/19 1047    aspirin delayed-release tablet 81 mg, 81 mg, Oral, DAILY, Evangelina De Dios MD, 81 mg at 06/29/19 0834    glucose chewable tablet 16 g, 4 Tab, Oral, PRN, Emilia Boyd MD    dextrose (D50W) injection syrg 12.5-25 g, 25-50 mL, IntraVENous, PRN, Emilia Boyd MD    glucagon (GLUCAGEN) injection 1 mg, 1 mg, IntraMUSCular, PRN, Emilia Boyd MD    insulin lispro (HUMALOG) injection, , SubCUTAneous, AC&HS, Emilia Boyd MD, 2 Units at 06/29/19 0755    DOBUTamine (DOBUTREX) 500 mg/250 mL (2,000 mcg/mL) infusion, 5 mcg/kg/min, IntraVENous, TITRATE, Lorena Arriaza MD, Last Rate: 16.2 mL/hr at 06/29/19 1051, 5 mcg/kg/min at 06/29/19 1051    bumetanide (BUMEX) 0.25 mg/mL infusion, 0.5 mg/hr, IntraVENous, CONTINUOUS, Pollrohini, Kelvin CHIN NP, Last Rate: 2 mL/hr at 06/29/19 1051, 0.5 mg/hr at 06/29/19 1051    pantoprazole (PROTONIX) tablet 40 mg, 40 mg, Oral, ACB, Mark Treviño NP, 40 mg at 06/29/19 2333      Thank you for allowing me to participate in this patient's care.     Dione Wilson MD PhD  [address] [place] [address], [address]  Phone: [phone]  Fax: [phone]

## 2019-06-29 NOTE — CONSULTS
[facility]   [address] [facility], [address] [place] [address], [facility]  Phone: [phone] NOTE     Patient: Magdy Torres MRN: [id number]  PCP: Charli Blanca MD   :     1975  Age:   40 y.o. Sex:  male      Referring physician: Girish Bates MD  Reason for consultation: 40 y.o. male with Abdominal pain [R10.9]  Heart failure ([address] [O35.0] complicated by MICHAEL   Admission Date: 2019  3:39 AM  LOS: 0 days      ASSESSMENT and PLAN :   ARF   · Suspect Cardiorenal syndrome  · Renal Imaging : Kidneys unremarkable   · UA : trace proteinuria and hematuria , otherwise unremarkable   · Volume overloaded from CHF  · On ionotropes (Dobutaamine and Bulex 0.5 mg/r   · Made 6 liters urine yesterday and so decreased the bumex ggt to 0.25mg/r   · Cr 1.9 , margianl improvement   · No emergent need for dialysis and he is not a candidate for dialysis due to severe CMP and poor compliance   · nep will follow  · klc 40 meq bid   · Pt is refusing some meds     Acute on Chronic Systolic CHF w/ EF 1-21%  · Per cardiology     Anxiety   Depression   Type II DM   PTSD    Care Plan discussed with:  Pt and nurse          Subjective:   HPI: Magdy Torres is a 40 y.o.  male who has been admitted to the hospital for worsening SOB, fatigue and edema. Work up showeed EF 15% and so is on Dobutamine as  Well as bumex GGT . Responding well to GGT   Not giving me much h/o in my eval     Past Medical Hx:   Past Medical History:   Diagnosis Date    [facility]F (congestive heart failure) [address]     Diabetes ([facility]         Past Surgical Hx:     Past Surgical History:   Procedure Laterality Date    HX OTHER SURGICAL      shoulder, hand, elbow and stomach surgery          Allergies   Allergen Reactions    Demerol [Meperidine] Other (comments)       Social Hx:  reports that he has never smoked. He does not have any smokeless tobacco history on file.  He reports that he does not drink alcohol or use drugs. No family history on file. Review of Systems:  A thorough twelve point review of system was performed today. Pertinent positives and negatives are mentioned in the HPI. The reminder of the ROS is negative and noncontributory.      Objective:    Vitals:    Vitals:    06/29/19 0009 06/29/19 0330 06/29/19 0907 06/29/19 1134   BP: 111/62 119/67 99/59 95/68   Pulse: (!) 102 99 (!) 107 100   Resp: 18 18 16 16   Temp: 98 °F (36.7 °C) 98.1 °F (36.7 °C) 97.9 °F (36.6 °C) 98.1 °F (36.7 °C)   SpO2: 95% 96% 98% 95%   Weight: 119.1 kg (262 lb 9.1 oz)      Height:         I&O's:  06/28 0701 - 06/29 0700  In: 820 [P.O.:820]  Out: 6105 [Urine:6105]  Visit Vitals  BP 95/68 (BP 1 Location: Left arm)   Pulse 100   Temp 98.1 °F (36.7 °C)   Resp 16   Ht 6' 1\" (1.854 m)   Wt 119.1 kg (262 lb 9.1 oz)   SpO2 95%   BMI 34.64 kg/m²       Physical Exam:  General:  Lethargic, ill looking   HEENT: PERRL,  No Pallor , No Icterus  Neck: Supple,no mass palpable  Lungs : CTA  CVS: RRR, S1 S2 normal, + murmur   Abdomen: Soft, NT, BS +  Extremities: 2+ Edema  Skin: scars- generalized   MS: No joint swelling, erythema, warmth  Neurologic: lethargic   Psych: Unable to assess    Laboratory Results:    Recent Labs     06/29/19  0427 06/28/19  0929 06/28/19  0504 06/27/19  1242     --  137 138   K 3.3*  --  4.8 4.2     --  102 102   CO2 34*  --  24 26   *  --  174* 158*   BUN 39*  --  46* 47*   CREA 1.98*  --  2.96* 2.85*   CA 8.5  --  8.6 8.5   MG 1.6 2.1  --   --    ALB 3.0*  --  3.0* 3.0*   SGOT 104*  --  114* 69*   *  --  126* 123*     Recent Labs     06/29/19  0427 06/28/19  0504 06/27/19  1242   WBC 3.9* 5.8 5.6   HGB 11.5* 12.0* 12.0*   HCT 37.9 39.4 39.8    207 204     No results found for: SDES  No results found for: CULT  Recent Results (from the past 24 hour(s))   AMMONIA    Collection Time: 06/28/19  5:33 PM   Result Value Ref Range    Ammonia 35 (H) <32 UMOL/L   IRON PROFILE Collection Time: 06/28/19  5:33 PM   Result Value Ref Range    Iron 19 (L) 35 - 150 ug/dL    TIBC 382 250 - 450 ug/dL    Iron % saturation 5 (L) 20 - 50 %   FERRITIN    Collection Time: 06/28/19  5:33 PM   Result Value Ref Range    Ferritin 35 26 - 388 NG/ML   T3, FREE    Collection Time: 06/28/19  5:33 PM   Result Value Ref Range    Free Triiodothyronine (T3) 2.4 2.2 - 4.0 pg/mL   HIV 1/2 AG/AB, 4TH GENERATION,W RFLX CONFIRM    Collection Time: 06/28/19  5:33 PM   Result Value Ref Range    HIV 1/2 Interpretation NONREACTIVE NR      HIV 1/2 result comment SEE NOTE     HEPATITIS PANEL, ACUTE    Collection Time: 06/28/19  5:33 PM   Result Value Ref Range    Hepatitis A, IgM NONREACTIVE NR      __          Hepatitis B surface Ag <0.10 Index    Hep B surface Ag Interp. NEGATIVE  NEG      __          Hepatitis B core, IgM NONREACTIVE NR      __          Hep C  virus Ab Interp.  NONREACTIVE NR      Hep C  virus Ab comment Method used is Scientific Intake     GLUCOSE, POC    Collection Time: 06/28/19  6:48 PM   Result Value Ref Range    Glucose (POC) 196 (H) 65 - 100 mg/dL    Performed by [facility]    GLUCOSE, POC    Collection Time: 06/28/19 10:44 PM   Result Value Ref Range    Glucose (POC) 226 (H) 65 - 100 mg/dL    Performed by Mariya Phillips    LIPID PANEL    Collection Time: 06/29/19  4:27 AM   Result Value Ref Range    LIPID PROFILE          Cholesterol, total 86 <200 MG/DL    Triglyceride 51 <150 MG/DL    HDL Cholesterol 29 MG/DL    LDL, calculated 46.8 0 - 100 MG/DL    VLDL, calculated 10.2 MG/DL    CHOL/HDL Ratio 3.0 0.0 - 5.0     CBC WITH AUTOMATED DIFF    Collection Time: 06/29/19  4:27 AM   Result Value Ref Range    WBC 3.9 (L) 4.1 - 11.1 K/uL    RBC 3.95 (L) 4.10 - 5.70 M/uL    HGB 11.5 (L) 12.1 - 17.0 g/dL    HCT 37.9 36.6 - 50.3 %    MCV 95.9 80.0 - 99.0 FL    MCH 29.1 26.0 - 34.0 PG    MCHC 30.3 30.0 - 36.5 g/dL    RDW 13.5 11.5 - 14.5 %    PLATELET 393 790 - 141 K/uL    MPV 10.9 8.9 - 12.9 FL    NRBC 0.0 0  WBC    ABSOLUTE NRBC 0.00 0.00 - 0.01 K/uL    NEUTROPHILS 53 32 - 75 %    LYMPHOCYTES 29 12 - 49 %    MONOCYTES 16 (H) 5 - 13 %    EOSINOPHILS 2 0 - 7 %    BASOPHILS 0 0 - 1 %    IMMATURE GRANULOCYTES 0 0.0 - 0.5 %    ABS. NEUTROPHILS 2.1 1.8 - 8.0 K/UL    ABS. LYMPHOCYTES 1.1 0.8 - 3.5 K/UL    ABS. MONOCYTES 0.6 0.0 - 1.0 K/UL    ABS. EOSINOPHILS 0.1 0.0 - 0.4 K/UL    ABS. BASOPHILS 0.0 0.0 - 0.1 K/UL    ABS. IMM. GRANS. 0.0 0.00 - 0.04 K/UL    DF AUTOMATED     NT-PRO BNP    Collection Time: 06/29/19  4:27 AM   Result Value Ref Range    NT pro-BNP 1,226 (H) <125 PG/ML   MAGNESIUM    Collection Time: 06/29/19  4:27 AM   Result Value Ref Range    Magnesium 1.6 1.6 - 2.4 mg/dL   LACTIC ACID    Collection Time: 06/29/19  4:27 AM   Result Value Ref Range    Lactic acid 1.8 0.4 - 2.0 MMOL/L   METABOLIC PANEL, COMPREHENSIVE    Collection Time: 06/29/19  4:27 AM   Result Value Ref Range    Sodium 145 136 - 145 mmol/L    Potassium 3.3 (L) 3.5 - 5.1 mmol/L    Chloride 103 97 - 108 mmol/L    CO2 34 (H) 21 - 32 mmol/L    Anion gap 8 5 - 15 mmol/L    Glucose 148 (H) 65 - 100 mg/dL    BUN 39 (H) 6 - 20 MG/DL    Creatinine 1.98 (H) 0.70 - 1.30 MG/DL    BUN/Creatinine ratio 20 12 - 20      GFR est AA 45 (L) >60 ml/min/1.73m2    GFR est non-AA 37 (L) >60 ml/min/1.73m2    Calcium 8.5 8.5 - 10.1 MG/DL    Bilirubin, total 1.9 (H) 0.2 - 1.0 MG/DL    ALT (SGPT) 128 (H) 12 - 78 U/L    AST (SGOT) 104 (H) 15 - 37 U/L    Alk.  phosphatase 291 (H) 45 - 117 U/L    Protein, total 6.3 (L) 6.4 - 8.2 g/dL    Albumin 3.0 (L) 3.5 - 5.0 g/dL    Globulin 3.3 2.0 - 4.0 g/dL    A-G Ratio 0.9 (L) 1.1 - 2.2     GLUCOSE, POC    Collection Time: 06/29/19  7:28 AM   Result Value Ref Range    Glucose (POC) 180 (H) 65 - 100 mg/dL    Performed by [address] [place] [address], POC    Collection Time: 06/29/19 11:36 AM   Result Value Ref Range    Glucose (POC) 234 (H) 65 - 100 mg/dL    Performed by [facility]           Urine dipstick:   Lab Results Component Value Date/Time    Color YELLOW/STRAW 06/28/2019 05:04 AM    Appearance CLOUDY (A) 06/28/2019 05:04 AM    Specific gravity 1.017 06/28/2019 05:04 AM    pH (UA) 5.0 06/28/2019 05:04 AM    Protein 100 (A) 06/28/2019 05:04 AM    Glucose NEGATIVE  06/28/2019 05:04 AM    Ketone NEGATIVE  06/28/2019 05:04 AM    Bilirubin NEGATIVE  06/28/2019 05:04 AM    Urobilinogen 0.2 06/28/2019 05:04 AM    Nitrites NEGATIVE  06/28/2019 05:04 AM    Leukocyte Esterase NEGATIVE  06/28/2019 05:04 AM    Epithelial cells FEW 06/28/2019 05:04 AM    Bacteria NEGATIVE  06/28/2019 05:04 AM    WBC 0-4 06/28/2019 05:04 AM    RBC 0-5 06/28/2019 05:04 AM       I have reviewed the following: All pertinent labs, microbiology data, radiology imaging for my assessment     Medications list Personally Reviewed   [x]      Yes     []               No       Medications:  Prior to Admission medications    Medication Sig Start Date End Date Taking? Authorizing Provider   gabapentin (NEURONTIN) 100 mg capsule Take 100 mg by mouth three (3) times daily. Indications: Neuropathic Pain   Yes Provider, Historical   potassium chloride SR (KLOR-CON 10) 10 mEq tablet Take 20 mEq by mouth daily. Yes Provider, Historical   omeprazole (PRILOSEC) 20 mg capsule Take 20 mg by mouth two (2) times a day. Yes Provider, Historical   therapeutic multivitamin (THERAGRAN) tablet Take 1 Tab by mouth daily. Yes Provider, Historical   furosemide (LASIX) 20 mg tablet Take 1 Tab by mouth two (2) times a day for 7 days. 6/27/19 7/4/19  Kevin Paul MD        Thank you for allowing us to participate in the care of this patient. We will follow patient. Please dont hesitate to call with any questions    Romy Ambriz MD  [address] [place] [address] Nephrology [facility]   [address] [facility] Fabby Canela [address] [place] [address]  Phone - [phone]   Fax - [phone]  [email or web address]. [email or web address]

## 2019-06-29 NOTE — PROGRESS NOTES
Hospitalist Progress Note  Gracie Jimenez MD  Answering service: [phone] OR [phone] from in house phone  Cell:       Date of Service:  2019  NAME:  Anna Ellsworth  :  1975  MRN:  [id number]      Admission Summary:   patient is a 70-year-old gentleman with past medical history of congestive heart failure, unknown classification, diabetes, medication noncompliance, who presents to the hospital with the abovementioned symptoms. The patient reports that he has been having some swelling in his legs and swelling in his abdomen with abdominal pain that has been going on for past many days. The patient came to [facility] ER yesterday because of the same problems, had a CT of the abdomen and pelvis done, which showed some nonspecific stuff, but nothing significantly acute. He was also complaining of some chest pain, shortness of breath, and increased edema. The patient was seen by Cardiology and Dr. Binh Romero wanted the patient to be admitted. Interval history / Subjective:         Patient seen and examined bedside   Says swelling has gone down a lot !!  Not have a appetite and dont llke food here        Assessment & Plan:     Acute hypoxic hypoxemic respiratory failure. EF 15%, NYHA Class IV   patient on IV diuresis by bumex gtt and dobutamine gtt   , strict intakes and outputs, daily weights, aspirin, statin,  Not tolerant of beta locker    Cardiology consult. Elevated troponin,   likely secondary to demand ischemia. MICHAEL on CKD   Cannot take ACE   On bumex   nephrology     diabetes, poorly controlled. n sliding scale NovoLog insulin,   Accu-Cheks, diet control, Hba1c   close monitoring. Hypokalemia   Replace po       High risk of SCD  Will need LifeVest prior to D/C per cards       elevated TSH   With rest profile normaL     1.6 cm nodular density in the right lung base.   Intermediate risk   pulm consult   Given has poor follow up and AMAs in past    Large incional hernia       deep venous thrombosis prophylaxis. heparin.         Code status: FULL       Care Plan discussed with: Patient/Family  Disposition tbd      Hospital Problems  Never Reviewed          Codes Class Noted POA    * (Principal) Abdominal pain ICD-10-CM: R10.9  ICD-9-CM: 789.00  6/28/2019 Unknown                Review of Systems:   A comprehensive review of systems was negative except for that written in the HPI. Vital Signs:    Last 24hrs VS reviewed since prior progress note. Most recent are:  Visit Vitals  BP 99/59 (BP 1 Location: Right arm)   Pulse (!) 107   Temp 97.9 °F (36.6 °C)   Resp 16   Ht 6' 1\" (1.854 m)   Wt 119.1 kg (262 lb 9.1 oz)   SpO2 98%   BMI 34.64 kg/m²         Intake/Output Summary (Last 24 hours) at 6/29/2019 1111  Last data filed at 6/29/2019 0910  Gross per 24 hour   Intake 820 ml   Output 6855 ml   Net -6035 ml        Physical Examination:             Constitutional:  No acute distress, cooperative, pleasant    ENT:  Oral mucous moist, oropharynx benign. Neck supple,    Resp:  CTA bilaterally. No wheezing/rhonchi/rales. No accessory muscle use   CV:  Regular rhythm, normal rate, no murmurs, gallops, rubs    GI:  Soft, non distended, non tender. normoactive bowel sounds, no hepatosplenomegaly     Musculoskeletal:   2 plus pedal edema    Neurologic:  Moves all extremities.   AAOx3, CN II-XII reviewed            Data Review:    Review and/or order of clinical lab test      Labs:     Recent Labs     06/29/19 0427 06/28/19  0504   WBC 3.9* 5.8   HGB 11.5* 12.0*   HCT 37.9 39.4    207     Recent Labs     06/29/19  0427 06/28/19  0929 06/28/19  0504 06/27/19  1242     --  137 138   K 3.3*  --  4.8 4.2     --  102 102   CO2 34*  --  24 26   BUN 39*  --  46* 47*   CREA 1.98*  --  2.96* 2.85*   *  --  174* 158*   CA 8.5  --  8.6 8.5   MG 1.6 2.1  --   --      Recent Labs     06/29/19  0427 06/28/19  0501 06/27/19  1242   SGOT 104* 114* 69*   * 126* 123*   * 314* 300*   TBILI 1.9* 1.3* 1.8*   TP 6.3* 6.5 6.3*   ALB 3.0* 3.0* 3.0*   GLOB 3.3 3.5 3.3   LPSE  --   --  510*     No results for input(s): INR, PTP, APTT in the last 72 hours. No lab exists for component: INREXT   Recent Labs     06/28/19  1733   TIBC 382   PSAT 5*   FERR 35      No results found for: FOL, RBCF   No results for input(s): PH, PCO2, PO2 in the last 72 hours.   Recent Labs     06/28/19  1456 06/28/19  0929 06/28/19  0504 06/27/19  1242   CKNDX  --   --   --  0.9   TROIQ 0.15* 0.18* 0.18* 0.18*     Lab Results   Component Value Date/Time    Cholesterol, total 86 06/29/2019 04:27 AM    HDL Cholesterol 29 06/29/2019 04:27 AM    LDL, calculated 46.8 06/29/2019 04:27 AM    Triglyceride 51 06/29/2019 04:27 AM    CHOL/HDL Ratio 3.0 06/29/2019 04:27 AM     Lab Results   Component Value Date/Time    Glucose (POC) 180 (H) 06/29/2019 07:28 AM    Glucose (POC) 226 (H) 06/28/2019 10:44 PM    Glucose (POC) 196 (H) 06/28/2019 06:48 PM     Lab Results   Component Value Date/Time    Color YELLOW/STRAW 06/28/2019 05:04 AM    Appearance CLOUDY (A) 06/28/2019 05:04 AM    Specific gravity 1.017 06/28/2019 05:04 AM    pH (UA) 5.0 06/28/2019 05:04 AM    Protein 100 (A) 06/28/2019 05:04 AM    Glucose NEGATIVE  06/28/2019 05:04 AM    Ketone NEGATIVE  06/28/2019 05:04 AM    Bilirubin NEGATIVE  06/28/2019 05:04 AM    Urobilinogen 0.2 06/28/2019 05:04 AM    Nitrites NEGATIVE  06/28/2019 05:04 AM    Leukocyte Esterase NEGATIVE  06/28/2019 05:04 AM    Epithelial cells FEW 06/28/2019 05:04 AM    Bacteria NEGATIVE  06/28/2019 05:04 AM    WBC 0-4 06/28/2019 05:04 AM    RBC 0-5 06/28/2019 05:04 AM         Medications Reviewed:     Current Facility-Administered Medications   Medication Dose Route Frequency    sodium chloride (NS) flush 5-40 mL  5-40 mL IntraVENous Q8H    sodium chloride (NS) flush 5-40 mL  5-40 mL IntraVENous PRN    acetaminophen (TYLENOL) tablet 650 mg  650 mg Oral Q4H PRN    heparin (porcine) injection 5,000 Units  5,000 Units SubCUTAneous Q8H    aspirin delayed-release tablet 81 mg  81 mg Oral DAILY    glucose chewable tablet 16 g  4 Tab Oral PRN    dextrose (D50W) injection syrg 12.5-25 g  25-50 mL IntraVENous PRN    glucagon (GLUCAGEN) injection 1 mg  1 mg IntraMUSCular PRN    insulin lispro (HUMALOG) injection   SubCUTAneous AC&HS    DOBUTamine (DOBUTREX) 500 mg/250 mL (2,000 mcg/mL) infusion  5 mcg/kg/min IntraVENous TITRATE    bumetanide (BUMEX) 0.25 mg/mL infusion  0.5 mg/hr IntraVENous CONTINUOUS    pantoprazole (PROTONIX) tablet 40 mg  40 mg Oral ACB     ______________________________________________________________________  EXPECTED LENGTH OF STAY: - - -  ACTUAL LENGTH OF STAY:          Tolbert Cranker, MD
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
## 2019-06-29 NOTE — ROUTINE PROCESS
Bedside shift change report given to Nabila Berg (oncoming nurse) by Julio Cisse (offgoing nurse). Report included the following information SBAR, Kardex, Procedure Summary, Intake/Output, Recent Results, Med Rec Status and Cardiac Rhythm NSR.

## 2019-06-29 NOTE — PROGRESS NOTES
Paged Dr. Aquiles Haddad to notify her of patients refusal for Iron, labs, and an IV. Patient refuses to sign consent for records from the [facility] ED. Pulmonary requested CT scans and Xrays.

## 2019-06-29 NOTE — PROGRESS NOTES
2108: page placed to on call hospitalist, patient c/o pain in abdomen, not wanting ordered Bentyl, or any pain medication, \"it is not pain, it is cramping because I have not had my potassium pill, pain medicine makes me constipated, I don't take any\". Per patient med list, taking potassium 20meq every day. 2111: Per Danii Rocha NP, patient potassium is on the \"high side\" (4.8) will redraw in the morning and add/adjust medication as needed. Informed patient, verbalized understanding. No signs of pain/cramping at this time. Will monitor. 2208: Patient requesting Gabapentin, medication not listed for patient, pt states that \"I told them in the ER I take Gabapentin 100mg 3 times a day for the pain in my legs but no one was listening to me\". Patient assured that call would be made to MD and medication will be added to home med list, pt states, \"You can just make a note for the doctor, I don't need it tonight\". Will pass on to dayshift and make not in patient's chart.

## 2019-06-29 NOTE — PROGRESS NOTES
6/29/19 0800: Bedside and Verbal shift change report given to Quin Mike (oncoming nurse) by [employer] (offgoing nurse). Report included the following information SBAR, Kardex, ED Summary, Intake/Output, MAR, Recent Results, Med Rec Status and Cardiac Rhythm Sinus Tach. Problem: Diabetes Self-Management  Goal: *Disease process and treatment process  Description  Define diabetes and identify own type of diabetes; list 3 options for treating diabetes. Outcome: Progressing Towards Goal  Goal: *Incorporating nutritional management into lifestyle  Description  Describe effect of type, amount and timing of food on blood glucose; list 3 methods for planning meals. Outcome: Progressing Towards Goal  Goal: *Incorporating physical activity into lifestyle  Description  State effect of exercise on blood glucose levels. Outcome: Progressing Towards Goal  Goal: *Developing strategies to promote health/change behavior  Description  Define the ABC's of diabetes; identify appropriate screenings, schedule and personal plan for screenings. Outcome: Progressing Towards Goal  Goal: *Using medications safely  Description  State effect of diabetes medications on diabetes; name diabetes medication taking, action and side effects. Outcome: Progressing Towards Goal  Goal: *Monitoring blood glucose, interpreting and using results  Description  Identify recommended blood glucose targets  and personal targets. Outcome: Progressing Towards Goal  Goal: *Prevention, detection, treatment of acute complications  Description  List symptoms of hyper- and hypoglycemia; describe how to treat low blood sugar and actions for lowering  high blood glucose level. Outcome: Progressing Towards Goal  Goal: *Prevention, detection and treatment of chronic complications  Description  Define the natural course of diabetes and describe the relationship of blood glucose levels to long term complications of diabetes.   Outcome: Progressing Towards Goal  Goal: *Developing strategies to address psychosocial issues  Description  Describe feelings about living with diabetes; identify support needed and support network  Outcome: Progressing Towards Goal     Problem: Patient Education: Go to Patient Education Activity  Goal: Patient/Family Education  Outcome: Progressing Towards Goal     Problem: Patient Education: Go to Patient Education Activity  Goal: Patient/Family Education  Outcome: Progressing Towards Goal     Problem: Heart Failure: Day 1  Goal: Activity/Safety  Outcome: Resolved/Met  Goal: Consults, if ordered  Outcome: Resolved/Met  Goal: Diagnostic Test/Procedures  Outcome: Resolved/Met  Goal: Nutrition/Diet  Outcome: Resolved/Met  Goal: Discharge Planning  Outcome: Resolved/Met  Goal: Medications  Outcome: Resolved/Met  Goal: Respiratory  Outcome: Resolved/Met  Goal: Treatments/Interventions/Procedures  Outcome: Resolved/Met  Goal: Psychosocial  Outcome: Resolved/Met  Goal: *Oxygen saturation within defined limits  Outcome: Resolved/Met  Goal: *Hemodynamically stable  Outcome: Resolved/Met  Goal: *Optimal pain control at patient's stated goal  Outcome: Resolved/Met  Goal: *Anxiety reduced or absent  Outcome: Resolved/Met

## 2019-06-30 ENCOUNTER — APPOINTMENT (OUTPATIENT)
Dept: VASCULAR SURGERY | Age: 44
DRG: 291 | End: 2019-06-30
Attending: INTERNAL MEDICINE
Payer: MEDICARE

## 2019-06-30 VITALS
TEMPERATURE: 99.4 F | HEART RATE: 116 BPM | OXYGEN SATURATION: 97 % | WEIGHT: 240.6 LBS | BODY MASS INDEX: 31.89 KG/M2 | DIASTOLIC BLOOD PRESSURE: 66 MMHG | SYSTOLIC BLOOD PRESSURE: 99 MMHG | RESPIRATION RATE: 16 BRPM | HEIGHT: 73 IN

## 2019-06-30 LAB
ALBUMIN SERPL-MCNC: 3.1 G/DL (ref 3.5–5)
ALBUMIN/GLOB SERPL: 1.1 {RATIO} (ref 1.1–2.2)
ALP SERPL-CCNC: 279 U/L (ref 45–117)
ALT SERPL-CCNC: 109 U/L (ref 12–78)
ANION GAP SERPL CALC-SCNC: 7 MMOL/L (ref 5–15)
AST SERPL-CCNC: 77 U/L (ref 15–37)
BILIRUB SERPL-MCNC: 2.1 MG/DL (ref 0.2–1)
BNP SERPL-MCNC: 667 PG/ML
BUN SERPL-MCNC: 27 MG/DL (ref 6–20)
BUN/CREAT SERPL: 16 (ref 12–20)
CALCIUM SERPL-MCNC: 8.2 MG/DL (ref 8.5–10.1)
CHLORIDE SERPL-SCNC: 100 MMOL/L (ref 97–108)
CO2 SERPL-SCNC: 36 MMOL/L (ref 21–32)
CREAT SERPL-MCNC: 1.68 MG/DL (ref 0.7–1.3)
GLOBULIN SER CALC-MCNC: 2.9 G/DL (ref 2–4)
GLUCOSE BLD STRIP.AUTO-MCNC: 187 MG/DL (ref 65–100)
GLUCOSE BLD STRIP.AUTO-MCNC: 194 MG/DL (ref 65–100)
GLUCOSE SERPL-MCNC: 127 MG/DL (ref 65–100)
LACTATE SERPL-SCNC: 2.2 MMOL/L (ref 0.4–2)
MAGNESIUM SERPL-MCNC: 1.8 MG/DL (ref 1.6–2.4)
POTASSIUM SERPL-SCNC: 3.7 MMOL/L (ref 3.5–5.1)
PROT SERPL-MCNC: 6 G/DL (ref 6.4–8.2)
SERVICE CMNT-IMP: ABNORMAL
SERVICE CMNT-IMP: ABNORMAL
SODIUM SERPL-SCNC: 143 MMOL/L (ref 136–145)

## 2019-06-30 PROCEDURE — 74011250636 HC RX REV CODE- 250/636: Performed by: FAMILY MEDICINE

## 2019-06-30 PROCEDURE — 74011250637 HC RX REV CODE- 250/637: Performed by: NURSE PRACTITIONER

## 2019-06-30 PROCEDURE — 83880 ASSAY OF NATRIURETIC PEPTIDE: CPT

## 2019-06-30 PROCEDURE — 83605 ASSAY OF LACTIC ACID: CPT

## 2019-06-30 PROCEDURE — 74011250636 HC RX REV CODE- 250/636: Performed by: INTERNAL MEDICINE

## 2019-06-30 PROCEDURE — 36415 COLL VENOUS BLD VENIPUNCTURE: CPT

## 2019-06-30 PROCEDURE — 74011250637 HC RX REV CODE- 250/637: Performed by: FAMILY MEDICINE

## 2019-06-30 PROCEDURE — 80053 COMPREHEN METABOLIC PANEL: CPT

## 2019-06-30 PROCEDURE — 82962 GLUCOSE BLOOD TEST: CPT

## 2019-06-30 PROCEDURE — 99225 PR SBSQ OBSERVATION CARE/DAY 25 MINUTES: CPT | Performed by: INTERNAL MEDICINE

## 2019-06-30 PROCEDURE — 74011250637 HC RX REV CODE- 250/637: Performed by: INTERNAL MEDICINE

## 2019-06-30 PROCEDURE — 74011000250 HC RX REV CODE- 250: Performed by: INTERNAL MEDICINE

## 2019-06-30 PROCEDURE — 83735 ASSAY OF MAGNESIUM: CPT

## 2019-06-30 PROCEDURE — 74011636637 HC RX REV CODE- 636/637: Performed by: FAMILY MEDICINE

## 2019-06-30 RX ORDER — MAGNESIUM SULFATE 1 G/100ML
1 INJECTION INTRAVENOUS ONCE
Status: COMPLETED | OUTPATIENT
Start: 2019-06-30 | End: 2019-06-30

## 2019-06-30 RX ORDER — POTASSIUM CHLORIDE 750 MG/1
40 TABLET, FILM COATED, EXTENDED RELEASE ORAL
Status: DISCONTINUED | OUTPATIENT
Start: 2019-06-30 | End: 2019-06-30 | Stop reason: HOSPADM

## 2019-06-30 RX ADMIN — MAGNESIUM SULFATE HEPTAHYDRATE 1 G: 1 INJECTION, SOLUTION INTRAVENOUS at 10:20

## 2019-06-30 RX ADMIN — POTASSIUM CHLORIDE 40 MEQ: 750 TABLET, FILM COATED, EXTENDED RELEASE ORAL at 09:55

## 2019-06-30 RX ADMIN — PANTOPRAZOLE SODIUM 40 MG: 40 TABLET, DELAYED RELEASE ORAL at 07:46

## 2019-06-30 RX ADMIN — DOBUTAMINE IN DEXTROSE 5 MCG/KG/MIN: 200 INJECTION, SOLUTION INTRAVENOUS at 05:45

## 2019-06-30 RX ADMIN — ASPIRIN 81 MG: 81 TABLET ORAL at 09:55

## 2019-06-30 RX ADMIN — HEPARIN SODIUM 5000 UNITS: 5000 INJECTION INTRAVENOUS; SUBCUTANEOUS at 09:55

## 2019-06-30 RX ADMIN — INSULIN LISPRO 2 UNITS: 100 INJECTION, SOLUTION INTRAVENOUS; SUBCUTANEOUS at 07:46

## 2019-06-30 RX ADMIN — HEPARIN SODIUM 5000 UNITS: 5000 INJECTION INTRAVENOUS; SUBCUTANEOUS at 02:01

## 2019-06-30 RX ADMIN — INSULIN LISPRO 2 UNITS: 100 INJECTION, SOLUTION INTRAVENOUS; SUBCUTANEOUS at 12:26

## 2019-06-30 RX ADMIN — Medication 10 ML: at 05:50

## 2019-06-30 NOTE — PROGRESS NOTES
Patient refused record request from [facility] to review prior CT scans. Will sign off for now. If he would like for us to evaluate nodule, please consult. Can always follow-up at the [facility] for serial monitoring.

## 2019-06-30 NOTE — CDMP QUERY
#1 
 
Pt admitted with CHF/Pt noted to have diabetes with elevated A1c. If possible, please document in the progress notes and d/c summary if you are evaluating and / or treating any of the following: 
 
? Diabetes with Hyperglycemia ? Hyperglycemia ? Diabetes uncontrolled ? Other, please specify ? Clinically unable to determine The medical record reflects the following: 
  Risk Factors: DM Clinical Indicators:  
glucose 174-148 Hemoglobin A1c, (calculated): 8.6 (H) Est. average glucose: 200 Treatment: glucose monitoring,lispro sliding scale insulin Thank you, 
       Kajal Peraza [facility], [address] [facility]

## 2019-06-30 NOTE — CDMP QUERY
#2 
 
Pt admitted with CHF  and has Respiratory Failure documented. Please further specify type and acuity of Respiratory Failure in the medical record. Acute respiratory failure with Hypoxia Acute respiratory failure with Hypercapnia Acute Hypoxiua Other, please specify Clinically unable to determine The medical record reflects the following: 
   Risk Factors: CHF Clinical Indicators: resp rate 12-23 O2 sat 89% on room air ABGs 
6/28/2019 08:37 
pH (POC): 7.370 
pCO2 (POC): 41.6 pO2 (POC): 77 (L) HCO3 (POC): 24.0 
sO2 (POC): 95 Base deficit (POC): 1 Specimen type (POC): ARTERIAL Site: RIGHT RADIAL Device[de-identified] ROOM AIR Total resp. rate: 23 Allens test (POC): YES Treatment: supplemental oxygen @ 3 lts/min, ABGs, bumex IV Thank you, 
       Debby Hutson [address] [place] [address], [address] [facility]

## 2019-06-30 NOTE — PROGRESS NOTES
Problem: Diabetes Self-Management  Goal: *Disease process and treatment process  Description  Define diabetes and identify own type of diabetes; list 3 options for treating diabetes. Outcome: Progressing Towards Goal  Goal: *Incorporating nutritional management into lifestyle  Description  Describe effect of type, amount and timing of food on blood glucose; list 3 methods for planning meals. Outcome: Progressing Towards Goal  Goal: *Incorporating physical activity into lifestyle  Description  State effect of exercise on blood glucose levels. Outcome: Progressing Towards Goal  Goal: *Developing strategies to promote health/change behavior  Description  Define the ABC's of diabetes; identify appropriate screenings, schedule and personal plan for screenings. Outcome: Progressing Towards Goal  Goal: *Using medications safely  Description  State effect of diabetes medications on diabetes; name diabetes medication taking, action and side effects. Outcome: Progressing Towards Goal  Goal: *Monitoring blood glucose, interpreting and using results  Description  Identify recommended blood glucose targets  and personal targets. Outcome: Progressing Towards Goal  Goal: *Prevention, detection, treatment of acute complications  Description  List symptoms of hyper- and hypoglycemia; describe how to treat low blood sugar and actions for lowering  high blood glucose level. Outcome: Progressing Towards Goal  Goal: *Prevention, detection and treatment of chronic complications  Description  Define the natural course of diabetes and describe the relationship of blood glucose levels to long term complications of diabetes.   Outcome: Progressing Towards Goal  Goal: *Developing strategies to address psychosocial issues  Description  Describe feelings about living with diabetes; identify support needed and support network  Outcome: Progressing Towards Goal     Problem: Patient Education: Go to Patient Education Activity  Goal: Patient/Family Education  Outcome: Progressing Towards Goal     Problem: Patient Education: Go to Patient Education Activity  Goal: Patient/Family Education  Outcome: Progressing Towards Goal     Problem: Heart Failure: Day 2  Goal: Activity/Safety  Outcome: Resolved/Met  Goal: Consults, if ordered  Outcome: Resolved/Met  Goal: Diagnostic Test/Procedures  Outcome: Resolved/Met  Goal: Nutrition/Diet  Outcome: Resolved/Met  Goal: Discharge Planning  Outcome: Resolved/Met  Goal: Medications  Outcome: Resolved/Met  Goal: Respiratory  Outcome: Resolved/Met  Goal: Treatments/Interventions/Procedures  Outcome: Resolved/Met  Goal: Psychosocial  Outcome: Resolved/Met  Goal: *Oxygen saturation within defined limits  Outcome: Resolved/Met  Goal: *Hemodynamically stable  Outcome: Resolved/Met  Goal: *Optimal pain control at patient's stated goal  Outcome: Resolved/Met  Goal: *Anxiety reduced or absent  Outcome: Resolved/Met  Goal: *Demonstrates progressive activity  Outcome: Resolved/Met     Problem: Falls - Risk of  Goal: *Absence of Falls  Description  Document Maurizio Fall Risk and appropriate interventions in the flowsheet. Outcome: Progressing Towards Goal     Problem: Patient Education: Go to Patient Education Activity  Goal: Patient/Family Education  Outcome: Progressing Towards Goal     Problem: Pressure Injury - Risk of  Goal: *Prevention of pressure injury  Description  Document Dylon Scale and appropriate interventions in the flowsheet.   Outcome: Progressing Towards Goal     Problem: Patient Education: Go to Patient Education Activity  Goal: Patient/Family Education  Outcome: Progressing Towards Goal
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
## 2019-06-30 NOTE — PROGRESS NOTES
[address] [facility] in [place], [place]  Inpatient Progress Note      Patient name: Steve Benítez  Patient : 1975  Patient MRN: [id number]  Attending MD: Braden Whitaker MD  Date of service: 19    CHIEF COMPLAINT:  Acute on chronic systolic heart failure     HPI: 40y.o. year old male with a history of CM, etiology unclear, DM Type II (uncontrolled), and medical non-compliance who presented to the ED on  with complaints of abdominal pain after signing out AMA the day prior. Therese Orta reports related abdominal distention, nausea, vomiting, LE edema and dyspnea.  He notes abrupt onset of symptoms.  ED evaluation reveals elevated pro-BNP and MICHAEL on CKD, with Cr of 2.96.  CT performed was negative for acute abdominal process, but an incidental finding of right lung 1.6 cm mass is noted.  Cardiac evaluation including TTE was performed which was remarkable for LVEF 15%, markedly dilated LV (> 7 cm), severe MR and mod-severe TR.  He was started on dobutamine infusion and the [facility] has been consulted for assistance with the management of his HF.      Impression / Plan:   Heart Failure Status: NYHA Class IV     Acute systolic heart failure, etiology unclear, EF 15%, NYHA Class IV  Decrease dobutamine to 2.5 mcg/kg/min due to tachycardia  Patient declined corlanor  Check orthostatics; appears dry  Discontinue bumex gtt   US of RLE to r/o DVT  Give magnesium 1mg IV once  Continue scheduled potassium 40meq twice daily  Intolerant of BB due to dobutamine  Intolerant of ACEi/ARB/ARNI/AA due to MICHAEL   Strict I/O, daily weights, Na+ restricted diet   Check gammopathy panel, free T3, iron profile/ferritin, HIV, hepatitis   Recommend cMRI +/- LHC if renal function recovers  RHC when euvolemic  Patient declined synthroid  RHC early this week     IMCHAEL   Due to CRS  Inotrope assisted diuresis  Appreciate Dr. Kenny Castillo assistance      High risk of SCD  Will need LifeVest prior to D/C Keep K ? 4 and Mg > 2     Possible lung mass   Noted on CT  Pulmonary consult      Elevated TSH  Await results of free T3, free T4      PHYSICAL EXAM:  Visit Vitals  /64 (BP 1 Location: Right arm, BP Patient Position: At rest)   Pulse (!) 118   Temp 98.6 °F (37 °C)   Resp 18   Ht 6' 1\" (1.854 m)   Wt 240 lb 9.6 oz (109.1 kg)   SpO2 94%   BMI 31.74 kg/m²     General: Patient is well developed, well-nourished in no acute distress  HEENT: Normocephalic and atraumatic. No scleral icterus. Pupils are equal, round and reactive to light and accomodation. No conjunctival injection. Oropharynx is clear. Neck: Supple. No evidence of thyroid enlargements or lymphadenopathy. JVD: Cannot be appreciated   Lungs: Breath sounds are equal and clear bilaterally. No wheezes, rhonchi, or rales. Heart: Regular rate and rhythm with normal S1 and S2. No murmurs, gallops or rubs. Abdomen: Soft, no mass or tenderness. No organomegaly or hernia. Bowel sounds present. Genitourinary and rectal: deferred  Extremities: No cyanosis, clubbing, or RLE edema. Neurologic: No focal sensory or motor deficits are noted. Grossly intact. Psychiatric: Awake, alert an doriented x 3. Appropriate mood and affect. Skin: Warm, dry and well perfused. No lesions, nodules or rashes are noted. REVIEW OF SYSTEMS:  General: Denies fever, night sweats. Ear, nose and throat: Denies difficulty hearing, sinus problems, runny nose, post-nasal drip, ringing in ears, mouth sores, loose teeth, ear pain, nosebleeds, sore throate, facial pain or numbess  Cardiovascular: see above in the interval history  Respiratory: Denies cough, wheezing, sputum production, hemoptysis.   Gastrointestinal: Denies heartburn, constipation, intolerance to certain foods, diarrhea, abdominal pain, nausea, vomiting, difficulty swallowing, blood in stool  Kidney and bladder: Denies painful urination, frequent urination, urgency, prostate problems and impotence  Musculoskeletal: Denies joint pain, muscle weakness  Skin and hair: Denies change in existing skin lesions, hair loss or increase, breast changes    PAST MEDICAL HISTORY:  Past Medical History:   Diagnosis Date    CH[facility]congestive heart failure) ([facility])     Diabetes ([facility]        PAST SURGICAL HISTORY:  Past Surgical History:   Procedure Laterality Date    HX OTHER SURGICAL      shoulder, hand, elbow and stomach surgery        FAMILY HISTORY:  No family history on file. SOCIAL HISTORY:  Social History     Socioeconomic History    Marital status: SINGLE     Spouse name: Not on file    Number of children: Not on file    Years of education: Not on file    Highest education level: Not on file   Tobacco Use    Smoking status: Never Smoker   Substance and Sexual Activity    Alcohol use: Never     Frequency: Never    Drug use: Never       LABORATORY RESULTS:     Labs Latest Ref Rng & Units 6/30/2019 6/29/2019 6/28/2019 6/27/2019   WBC 4.1 - 11.1 K/uL - 3. 9(L) 5.8 5.6   RBC 4.10 - 5.70 M/uL - 3.95(L) 4.03(L) 4.09(L)   Hemoglobin 12.1 - 17.0 g/dL - 11. 5(L) 12. 0(L) 12. 0(L)   Hematocrit 36.6 - 50.3 % - 37.9 39.4 39.8   MCV 80.0 - 99.0 FL - 95.9 97.8 97.3   Platelets 320 - 183 K/uL - 192 207 204   Lymphocytes 12 - 49 % - 29 32 29   Monocytes 5 - 13 % - 16(H) 17(H) 17(H)   Eosinophils 0 - 7 % - 2 2 1   Basophils 0 - 1 % - 0 0 0   Albumin 3.5 - 5.0 g/dL 3. 1(L) 3.0(L) 3.0(L) 3.0(L)   Calcium 8.5 - 10.1 MG/DL 8. 2(L) 8.5 8.6 8.5   SGOT 15 - 37 U/L 77(H) 104(H) 114(H) 69(H)   Glucose 65 - 100 mg/dL 127(H) 148(H) 174(H) 158(H)   BUN 6 - 20 MG/DL 27(H) 39(H) 46(H) 47(H)   Creatinine 0.70 - 1.30 MG/DL 1.68(H) 1.98(H) 2.96(H) 2.85(H)   Sodium 136 - 145 mmol/L 143 145 137 138   Potassium 3.5 - 5.1 mmol/L 3.7 3. 3(L) 4.8 4.2   TSH 0.36 - 3.74 uIU/mL - - 11.10(H) -     Lab Results   Component Value Date/Time    TSH 11.10 (H) 06/28/2019 09:29 AM       CURRENT MEDICATIONS:    Current Facility-Administered Medications:     potassium chloride SR (KLOR-CON 10) tablet 40 mEq, 40 mEq, Oral, BID, Larissa Tineo MD, 40 mEq at 06/29/19 1741    levothyroxine (SYNTHROID) tablet 12.5 mcg, 12.5 mcg, Oral, DAILY, Larissa Tineo MD, 12.5 mcg at 06/29/19 1218    sodium chloride (NS) flush 5-40 mL, 5-40 mL, IntraVENous, Q8H, Evangelina De Dios MD, 10 mL at 06/30/19 0550    sodium chloride (NS) flush 5-40 mL, 5-40 mL, IntraVENous, PRN, Silvio Cool MD    acetaminophen (TYLENOL) tablet 650 mg, 650 mg, Oral, Q4H PRN, Silvio Cool MD    heparin (porcine) injection 5,000 Units, 5,000 Units, SubCUTAneous, Q8H, Stiven De Dios MD, 5,000 Units at 06/30/19 0201    aspirin delayed-release tablet 81 mg, 81 mg, Oral, DAILY, Evangelina De Dios MD, 81 mg at 06/29/19 0834    glucose chewable tablet 16 g, 4 Tab, Oral, PRN, Silvio Cool MD    dextrose (D50W) injection syrg 12.5-25 g, 25-50 mL, IntraVENous, PRN, Silvio Cool MD    glucagon (GLUCAGEN) injection 1 mg, 1 mg, IntraMUSCular, PRN, Silvio Cool MD    insulin lispro (HUMALOG) injection, , SubCUTAneous, AC&HS, Silvio Cool MD, 2 Units at 06/30/19 0746    DOBUTamine (DOBUTREX) 500 mg/250 mL (2,000 mcg/mL) infusion, 5 mcg/kg/min, IntraVENous, TITRATE, Fredo Canales MD, Last Rate: 16.2 mL/hr at 06/30/19 0545, 5 mcg/kg/min at 06/30/19 0545    bumetanide (BUMEX) 0.25 mg/mL infusion, 0.5 mg/hr, IntraVENous, CONTINUOUS, Polliard, Tory Nones T, NP, Last Rate: 2 mL/hr at 06/29/19 1051, 0.5 mg/hr at 06/29/19 1051    pantoprazole (PROTONIX) tablet 40 mg, 40 mg, Oral, ACB, Mark Treviño, NP, 40 mg at 06/30/19 6603      Thank you for allowing me to participate in this patient's care.     Natasha Arguello MD PhD  [address] [place] [address], [address]  Phone: [phone]  Fax: [phone]

## 2019-06-30 NOTE — PROGRESS NOTES
2300: patient asked \"do I have to take the insulin? My sugar isn't even high\" patient educated on medication, diabetes diagnosis, blood sugar levels and the importance of medication compliance to prevent further complications. patient nodded, asked Binnie End you comfortable with taking the medication? \" patient states, \"I never said I wasn't going to take it, I was just asking\". Medication administered. 2314: patient requesting for home medication, Gabapentin 100mg capsule 3xday to be ordered, which was discussed with patient last night and patient \"did not need it right now\". Page placed to Pooja Marc NP for review. 2323: return call from Pooja Marc NP, one time order for Gabapentin 100mg PO now, note written on board to discuss medication with rounding physician in the AM, will notify dayshift. 6/30/19 0608: Critical Lab, Lactic Acid 2.2, Pooja Marc NP paged, notified, no new orders. 7302: Bedside and Verbal shift change report given to Quin Mike (oncoming nurse) by Delfin Buck (offgoing nurse). Report included the following information SBAR, Kardex, Intake/Output, MAR, Recent Results, Med Rec Status and Cardiac Rhythm Sinus Tach.

## 2019-07-01 ENCOUNTER — TELEPHONE (OUTPATIENT)
Dept: CASE MANAGEMENT | Age: 44
End: 2019-07-01

## 2019-07-03 NOTE — DISCHARGE SUMMARY
Discharge Summary       PATIENT ID: Marta Crocker  MRN: [id number]   YOB: 1975    DATE OF ADMISSION: 6/28/2019  3:39 AM    DATE OF DISCHARGE:  6/30/19  PRIMARY CARE PROVIDER: Stella De La Vega MD     ATTENDING PHYSICIAN: Cindy Rodriguez  DISCHARGING PROVIDER: Leatha Rangel MD    To contact this individual call [phone] and ask the  to page. If unavailable ask to be transferred the Adult Hospitalist Department. CONSULTATIONS: IP CONSULT TO CARDIOLOGY  IP CONSULT TO ADVANCED HEART FAILURE  IP CONSULT TO NEPHROLOGY  IP CONSULT TO PULMONOLOGY    PROCEDURES/SURGERIES: * No surgery found *    ADMITTING DIAGNOSES & HOSPITAL COURSE:     FOLLOW UP APPOINTMENTS:    Follow-up Information    None       patient is a 49-year-old gentleman with past medical history of congestive heart failure, unknown classification, diabetes, medication noncompliance, who presents to the hospital with the abovementioned symptoms.  The patient reports that he has been having some swelling in his legs and swelling in his abdomen with abdominal pain that has been going on for past many days.  The patient came to [facility] ER yesterday because of the same problems, had a CT of the abdomen and pelvis done, which showed some nonspecific stuff, but nothing significantly acute.  He was also complaining of some chest pain, shortness of breath, and increased edema.  The patient was seen by Cardiology and Dr. Trudy Ely wanted the patient to be admitted.                           Acute hypoxic hypoxemic respiratory failure. EF 15%, NYHA Class IV   patient on IV diuresis by bumex gtt and dobutamine gtt   , strict intakes and outputs, daily weights, aspirin, statin,  Not tolerant of beta locker    Cardiology consult.        Elevated troponin,   likely secondary to demand ischemia.        MICHAEL on CKD   Cannot take ACE   On bumex   nephrology      diabetes, poorly controlled.    n sliding scale NovoLog insulin,   Accu-Cheks, diet control, Hba1c   close monitoring.        Hypokalemia   Replace po         High risk of SCD  Will need LifeVest prior to D/C per cards         elevated TSH   With rest profile normaL      1.6 cm nodular density in the right lung base. Intermediate risk   pulm consult   Given has poor follow up and AMAs in past     Large incional hernia         deep venous thrombosis prophylaxis. heparin            PATIENT LEFT AMA         DISCHARGE MEDICATIONS:  Discharge Medication List as of 6/30/2019  2:59 PM            NOTIFY YOUR PHYSICIAN FOR ANY OF THE FOLLOWING:   Fever over 101 degrees for 24 hours. Chest pain, shortness of breath, fever, chills, nausea, vomiting, diarrhea, change in mentation, falling, weakness, bleeding. Severe pain or pain not relieved by medications. Or, any other signs or symptoms that you may have questions about.     DISPOSITION:    Home With:   OT  PT  HH  RN       Long term SNF/Inpatient Rehab    Independent/assisted living    Hospice    Other:       PATIENT CONDITION AT DISCHARGE:     Functional status    Poor     Deconditioned    x Independent      Cognition    x Lucid     Forgetful     Dementia      Catheters/lines (plus indication)    Galaviz     PICC     PEG    x None      Code status   x  Full code     DNR      *    Signed:   Kurt Nolasco MD  7/3/2019  10:25 AM

## 2019-07-04 LAB
ALBUMIN SERPL ELPH-MCNC: 3 G/DL (ref 2.9–4.4)
ALBUMIN/GLOB SERPL: 1.3 {RATIO} (ref 0.7–1.7)
ALPHA1 GLOB SERPL ELPH-MCNC: 0.3 G/DL (ref 0–0.4)
ALPHA2 GLOB SERPL ELPH-MCNC: 0.5 G/DL (ref 0.4–1)
B-GLOBULIN SERPL ELPH-MCNC: 1 G/DL (ref 0.7–1.3)
GAMMA GLOB SERPL ELPH-MCNC: 0.8 G/DL (ref 0.4–1.8)
GLOBULIN SER-MCNC: 2.5 G/DL (ref 2.2–3.9)
IGA SERPL-MCNC: 331 MG/DL (ref 90–386)
IGG SERPL-MCNC: 1007 MG/DL (ref 700–1600)
IGM SERPL-MCNC: 86 MG/DL (ref 20–172)
INTERPRETATION SERPL IEP-IMP: ABNORMAL
KAPPA LC FREE SER-MCNC: 35.2 MG/L (ref 3.3–19.4)
KAPPA LC FREE/LAMBDA FREE SER: 1.89 {RATIO} (ref 0.26–1.65)
LAMBDA LC FREE SERPL-MCNC: 18.6 MG/L (ref 5.7–26.3)
M PROTEIN SERPL ELPH-MCNC: ABNORMAL G/DL
PROT SERPL-MCNC: 5.5 G/DL (ref 6–8.5)

## 2021-08-03 PROBLEM — I50.9 CHF (CONGESTIVE HEART FAILURE) (HCC): Status: RESOLVED | Noted: 2019-06-28 | Resolved: 2021-08-03
